# Patient Record
Sex: FEMALE | Race: ASIAN | NOT HISPANIC OR LATINO | URBAN - METROPOLITAN AREA
[De-identification: names, ages, dates, MRNs, and addresses within clinical notes are randomized per-mention and may not be internally consistent; named-entity substitution may affect disease eponyms.]

---

## 2022-08-19 ENCOUNTER — APPOINTMENT (OUTPATIENT)
Dept: RADIOLOGY | Facility: MEDICAL CENTER | Age: 57
DRG: 305 | End: 2022-08-19
Attending: EMERGENCY MEDICINE
Payer: MEDICARE

## 2022-08-19 ENCOUNTER — HOSPITAL ENCOUNTER (INPATIENT)
Facility: MEDICAL CENTER | Age: 57
LOS: 2 days | DRG: 305 | End: 2022-08-22
Attending: EMERGENCY MEDICINE | Admitting: STUDENT IN AN ORGANIZED HEALTH CARE EDUCATION/TRAINING PROGRAM
Payer: MEDICARE

## 2022-08-19 DIAGNOSIS — R29.90 STROKE-LIKE SYMPTOM: ICD-10-CM

## 2022-08-19 DIAGNOSIS — I69.30 HISTORY OF CVA WITH RESIDUAL DEFICIT: ICD-10-CM

## 2022-08-19 DIAGNOSIS — R47.1 DYSARTHRIA: ICD-10-CM

## 2022-08-19 DIAGNOSIS — I16.1 HYPERTENSIVE EMERGENCY: Primary | ICD-10-CM

## 2022-08-19 LAB
ABO + RH BLD: NORMAL
ABO GROUP BLD: NORMAL
ALBUMIN SERPL BCP-MCNC: 4.8 G/DL (ref 3.2–4.9)
ALBUMIN/GLOB SERPL: 1.5 G/DL
ALP SERPL-CCNC: 101 U/L (ref 30–99)
ALT SERPL-CCNC: 27 U/L (ref 2–50)
ANION GAP SERPL CALC-SCNC: 15 MMOL/L (ref 7–16)
APPEARANCE UR: ABNORMAL
APTT PPP: 23.3 SEC (ref 24.7–36)
AST SERPL-CCNC: 41 U/L (ref 12–45)
BACTERIA #/AREA URNS HPF: ABNORMAL /HPF
BASOPHILS # BLD AUTO: 0.2 % (ref 0–1.8)
BASOPHILS # BLD: 0.03 K/UL (ref 0–0.12)
BILIRUB SERPL-MCNC: 0.5 MG/DL (ref 0.1–1.5)
BILIRUB UR QL STRIP.AUTO: NEGATIVE
BLD GP AB SCN SERPL QL: NORMAL
BUN SERPL-MCNC: 16 MG/DL (ref 8–22)
CALCIUM SERPL-MCNC: 9.7 MG/DL (ref 8.5–10.5)
CHLORIDE SERPL-SCNC: 104 MMOL/L (ref 96–112)
CO2 SERPL-SCNC: 22 MMOL/L (ref 20–33)
COLOR UR: YELLOW
CREAT SERPL-MCNC: 0.63 MG/DL (ref 0.5–1.4)
EKG IMPRESSION: NORMAL
EOSINOPHIL # BLD AUTO: 0 K/UL (ref 0–0.51)
EOSINOPHIL NFR BLD: 0 % (ref 0–6.9)
EPI CELLS #/AREA URNS HPF: NEGATIVE /HPF
ERYTHROCYTE [DISTWIDTH] IN BLOOD BY AUTOMATED COUNT: 38.5 FL (ref 35.9–50)
GFR SERPLBLD CREATININE-BSD FMLA CKD-EPI: 103 ML/MIN/1.73 M 2
GLOBULIN SER CALC-MCNC: 3.3 G/DL (ref 1.9–3.5)
GLUCOSE SERPL-MCNC: 442 MG/DL (ref 65–99)
GLUCOSE UR STRIP.AUTO-MCNC: 500 MG/DL
HCT VFR BLD AUTO: 42.9 % (ref 37–47)
HGB BLD-MCNC: 15.5 G/DL (ref 12–16)
HYALINE CASTS #/AREA URNS LPF: ABNORMAL /LPF
IMM GRANULOCYTES # BLD AUTO: 0.07 K/UL (ref 0–0.11)
IMM GRANULOCYTES NFR BLD AUTO: 0.5 % (ref 0–0.9)
INR PPP: 0.86 (ref 0.87–1.13)
KETONES UR STRIP.AUTO-MCNC: NEGATIVE MG/DL
LACTATE SERPL-SCNC: 3.1 MMOL/L (ref 0.5–2)
LEUKOCYTE ESTERASE UR QL STRIP.AUTO: NEGATIVE
LYMPHOCYTES # BLD AUTO: 0.78 K/UL (ref 1–4.8)
LYMPHOCYTES NFR BLD: 6 % (ref 22–41)
MCH RBC QN AUTO: 30.8 PG (ref 27–33)
MCHC RBC AUTO-ENTMCNC: 36.1 G/DL (ref 33.6–35)
MCV RBC AUTO: 85.3 FL (ref 81.4–97.8)
MICRO URNS: ABNORMAL
MONOCYTES # BLD AUTO: 0.53 K/UL (ref 0–0.85)
MONOCYTES NFR BLD AUTO: 4.1 % (ref 0–13.4)
NEUTROPHILS # BLD AUTO: 11.5 K/UL (ref 2–7.15)
NEUTROPHILS NFR BLD: 89.2 % (ref 44–72)
NITRITE UR QL STRIP.AUTO: NEGATIVE
NRBC # BLD AUTO: 0 K/UL
NRBC BLD-RTO: 0 /100 WBC
PH UR STRIP.AUTO: 6.5 [PH] (ref 5–8)
PLATELET # BLD AUTO: 201 K/UL (ref 164–446)
PMV BLD AUTO: 10.6 FL (ref 9–12.9)
POTASSIUM SERPL-SCNC: 5.2 MMOL/L (ref 3.6–5.5)
PROT SERPL-MCNC: 8.1 G/DL (ref 6–8.2)
PROT UR QL STRIP: NEGATIVE MG/DL
PROTHROMBIN TIME: 11.7 SEC (ref 12–14.6)
RBC # BLD AUTO: 5.03 M/UL (ref 4.2–5.4)
RBC # URNS HPF: ABNORMAL /HPF
RBC UR QL AUTO: NEGATIVE
RH BLD: NORMAL
SODIUM SERPL-SCNC: 141 MMOL/L (ref 135–145)
SP GR UR STRIP.AUTO: 1.04
TROPONIN T SERPL-MCNC: <6 NG/L (ref 6–19)
UROBILINOGEN UR STRIP.AUTO-MCNC: 0.2 MG/DL
WBC # BLD AUTO: 12.9 K/UL (ref 4.8–10.8)
WBC #/AREA URNS HPF: ABNORMAL /HPF

## 2022-08-19 PROCEDURE — 87040 BLOOD CULTURE FOR BACTERIA: CPT | Mod: 91

## 2022-08-19 PROCEDURE — 36415 COLL VENOUS BLD VENIPUNCTURE: CPT

## 2022-08-19 PROCEDURE — 83930 ASSAY OF BLOOD OSMOLALITY: CPT

## 2022-08-19 PROCEDURE — 0042T CT-CEREBRAL PERFUSION ANALYSIS: CPT

## 2022-08-19 PROCEDURE — 86900 BLOOD TYPING SEROLOGIC ABO: CPT

## 2022-08-19 PROCEDURE — 83605 ASSAY OF LACTIC ACID: CPT

## 2022-08-19 PROCEDURE — 94760 N-INVAS EAR/PLS OXIMETRY 1: CPT

## 2022-08-19 PROCEDURE — 86850 RBC ANTIBODY SCREEN: CPT

## 2022-08-19 PROCEDURE — 70450 CT HEAD/BRAIN W/O DYE: CPT | Mod: MG

## 2022-08-19 PROCEDURE — 84484 ASSAY OF TROPONIN QUANT: CPT

## 2022-08-19 PROCEDURE — 71045 X-RAY EXAM CHEST 1 VIEW: CPT

## 2022-08-19 PROCEDURE — 96374 THER/PROPH/DIAG INJ IV PUSH: CPT

## 2022-08-19 PROCEDURE — 85025 COMPLETE CBC W/AUTO DIFF WBC: CPT

## 2022-08-19 PROCEDURE — 700111 HCHG RX REV CODE 636 W/ 250 OVERRIDE (IP): Performed by: EMERGENCY MEDICINE

## 2022-08-19 PROCEDURE — 70496 CT ANGIOGRAPHY HEAD: CPT | Mod: MG

## 2022-08-19 PROCEDURE — 81001 URINALYSIS AUTO W/SCOPE: CPT

## 2022-08-19 PROCEDURE — 84100 ASSAY OF PHOSPHORUS: CPT

## 2022-08-19 PROCEDURE — 85730 THROMBOPLASTIN TIME PARTIAL: CPT

## 2022-08-19 PROCEDURE — 700117 HCHG RX CONTRAST REV CODE 255: Performed by: EMERGENCY MEDICINE

## 2022-08-19 PROCEDURE — 83036 HEMOGLOBIN GLYCOSYLATED A1C: CPT

## 2022-08-19 PROCEDURE — 86901 BLOOD TYPING SEROLOGIC RH(D): CPT

## 2022-08-19 PROCEDURE — 85610 PROTHROMBIN TIME: CPT

## 2022-08-19 PROCEDURE — 99284 EMERGENCY DEPT VISIT MOD MDM: CPT | Performed by: PSYCHIATRY & NEUROLOGY

## 2022-08-19 PROCEDURE — 80053 COMPREHEN METABOLIC PANEL: CPT

## 2022-08-19 PROCEDURE — 99285 EMERGENCY DEPT VISIT HI MDM: CPT

## 2022-08-19 PROCEDURE — 83735 ASSAY OF MAGNESIUM: CPT

## 2022-08-19 PROCEDURE — 70498 CT ANGIOGRAPHY NECK: CPT | Mod: MG

## 2022-08-19 PROCEDURE — 93005 ELECTROCARDIOGRAM TRACING: CPT | Performed by: EMERGENCY MEDICINE

## 2022-08-19 RX ORDER — HYDRALAZINE HYDROCHLORIDE 20 MG/ML
20 INJECTION INTRAMUSCULAR; INTRAVENOUS ONCE
Status: COMPLETED | OUTPATIENT
Start: 2022-08-19 | End: 2022-08-19

## 2022-08-19 RX ORDER — SODIUM CHLORIDE 9 MG/ML
1000 INJECTION, SOLUTION INTRAVENOUS ONCE
Status: COMPLETED | OUTPATIENT
Start: 2022-08-19 | End: 2022-08-20

## 2022-08-19 RX ADMIN — HYDRALAZINE HYDROCHLORIDE 20 MG: 20 INJECTION, SOLUTION INTRAMUSCULAR; INTRAVENOUS at 22:14

## 2022-08-19 RX ADMIN — IOHEXOL 40 ML: 350 INJECTION, SOLUTION INTRAVENOUS at 21:45

## 2022-08-19 RX ADMIN — IOHEXOL 75 ML: 350 INJECTION, SOLUTION INTRAVENOUS at 22:00

## 2022-08-20 ENCOUNTER — APPOINTMENT (OUTPATIENT)
Dept: RADIOLOGY | Facility: MEDICAL CENTER | Age: 57
DRG: 305 | End: 2022-08-20
Attending: PSYCHIATRY & NEUROLOGY
Payer: MEDICARE

## 2022-08-20 ENCOUNTER — APPOINTMENT (OUTPATIENT)
Dept: CARDIOLOGY | Facility: MEDICAL CENTER | Age: 57
DRG: 305 | End: 2022-08-20
Attending: STUDENT IN AN ORGANIZED HEALTH CARE EDUCATION/TRAINING PROGRAM
Payer: MEDICARE

## 2022-08-20 PROBLEM — E87.20 LACTIC ACIDOSIS: Status: ACTIVE | Noted: 2022-08-20

## 2022-08-20 PROBLEM — D72.829 LEUKOCYTOSIS: Status: ACTIVE | Noted: 2022-08-20

## 2022-08-20 PROBLEM — I69.322 DYSARTHRIA FOLLOWING CEREBRAL INFARCTION: Status: ACTIVE | Noted: 2022-08-20

## 2022-08-20 PROBLEM — I16.1 HYPERTENSIVE EMERGENCY: Status: ACTIVE | Noted: 2022-08-20

## 2022-08-20 PROBLEM — I69.322 DYSARTHRIA FOLLOWING CEREBRAL INFARCTION: Status: RESOLVED | Noted: 2022-08-20 | Resolved: 2022-08-20

## 2022-08-20 PROBLEM — R82.71 ASYMPTOMATIC BACTERIURIA: Status: ACTIVE | Noted: 2022-08-20

## 2022-08-20 PROBLEM — D72.829 LEUKOCYTOSIS: Status: RESOLVED | Noted: 2022-08-20 | Resolved: 2022-08-20

## 2022-08-20 PROBLEM — I69.30 HISTORY OF CVA WITH RESIDUAL DEFICIT: Status: ACTIVE | Noted: 2022-08-20

## 2022-08-20 LAB
BASOPHILS # BLD AUTO: 0.1 % (ref 0–1.8)
BASOPHILS # BLD: 0.01 K/UL (ref 0–0.12)
EOSINOPHIL # BLD AUTO: 0 K/UL (ref 0–0.51)
EOSINOPHIL NFR BLD: 0 % (ref 0–6.9)
ERYTHROCYTE [DISTWIDTH] IN BLOOD BY AUTOMATED COUNT: 39.6 FL (ref 35.9–50)
EST. AVERAGE GLUCOSE BLD GHB EST-MCNC: 214 MG/DL
GLUCOSE BLD STRIP.AUTO-MCNC: 164 MG/DL (ref 65–99)
GLUCOSE BLD STRIP.AUTO-MCNC: 165 MG/DL (ref 65–99)
GLUCOSE BLD STRIP.AUTO-MCNC: 194 MG/DL (ref 65–99)
GLUCOSE BLD STRIP.AUTO-MCNC: 238 MG/DL (ref 65–99)
GLUCOSE BLD STRIP.AUTO-MCNC: 309 MG/DL (ref 65–99)
HBA1C MFR BLD: 9.1 % (ref 4–5.6)
HCT VFR BLD AUTO: 38.2 % (ref 37–47)
HGB BLD-MCNC: 13.9 G/DL (ref 12–16)
IMM GRANULOCYTES # BLD AUTO: 0.03 K/UL (ref 0–0.11)
IMM GRANULOCYTES NFR BLD AUTO: 0.3 % (ref 0–0.9)
LACTATE SERPL-SCNC: 1.5 MMOL/L (ref 0.5–2)
LACTATE SERPL-SCNC: 1.6 MMOL/L (ref 0.5–2)
LACTATE SERPL-SCNC: 1.7 MMOL/L (ref 0.5–2)
LACTATE SERPL-SCNC: 1.9 MMOL/L (ref 0.5–2)
LV EJECT FRACT  99904: 65
LV EJECT FRACT MOD 2C 99903: 69.2
LV EJECT FRACT MOD 4C 99902: 70.43
LV EJECT FRACT MOD BP 99901: 68.24
LYMPHOCYTES # BLD AUTO: 2.13 K/UL (ref 1–4.8)
LYMPHOCYTES NFR BLD: 20.2 % (ref 22–41)
MAGNESIUM SERPL-MCNC: 1.8 MG/DL (ref 1.5–2.5)
MAGNESIUM SERPL-MCNC: 1.9 MG/DL (ref 1.5–2.5)
MAGNESIUM SERPL-MCNC: 2.5 MG/DL (ref 1.5–2.5)
MCH RBC QN AUTO: 31.2 PG (ref 27–33)
MCHC RBC AUTO-ENTMCNC: 36.4 G/DL (ref 33.6–35)
MCV RBC AUTO: 85.7 FL (ref 81.4–97.8)
MONOCYTES # BLD AUTO: 0.89 K/UL (ref 0–0.85)
MONOCYTES NFR BLD AUTO: 8.4 % (ref 0–13.4)
NEUTROPHILS # BLD AUTO: 7.48 K/UL (ref 2–7.15)
NEUTROPHILS NFR BLD: 71 % (ref 44–72)
NRBC # BLD AUTO: 0 K/UL
NRBC BLD-RTO: 0 /100 WBC
OSMOLALITY SERPL: 319 MOSM/KG H2O (ref 278–298)
PHOSPHATE SERPL-MCNC: 3.7 MG/DL (ref 2.5–4.5)
PHOSPHATE SERPL-MCNC: 4.3 MG/DL (ref 2.5–4.5)
PLATELET # BLD AUTO: 233 K/UL (ref 164–446)
PMV BLD AUTO: 10.2 FL (ref 9–12.9)
PROCALCITONIN SERPL-MCNC: 0.28 NG/ML
RBC # BLD AUTO: 4.46 M/UL (ref 4.2–5.4)
WBC # BLD AUTO: 10.5 K/UL (ref 4.8–10.8)

## 2022-08-20 PROCEDURE — 36415 COLL VENOUS BLD VENIPUNCTURE: CPT

## 2022-08-20 PROCEDURE — 700102 HCHG RX REV CODE 250 W/ 637 OVERRIDE(OP): Performed by: STUDENT IN AN ORGANIZED HEALTH CARE EDUCATION/TRAINING PROGRAM

## 2022-08-20 PROCEDURE — 82962 GLUCOSE BLOOD TEST: CPT | Mod: 91

## 2022-08-20 PROCEDURE — 99223 1ST HOSP IP/OBS HIGH 75: CPT | Mod: AI,GC | Performed by: STUDENT IN AN ORGANIZED HEALTH CARE EDUCATION/TRAINING PROGRAM

## 2022-08-20 PROCEDURE — 770020 HCHG ROOM/CARE - TELE (206)

## 2022-08-20 PROCEDURE — A9270 NON-COVERED ITEM OR SERVICE: HCPCS | Performed by: STUDENT IN AN ORGANIZED HEALTH CARE EDUCATION/TRAINING PROGRAM

## 2022-08-20 PROCEDURE — 80053 COMPREHEN METABOLIC PANEL: CPT

## 2022-08-20 PROCEDURE — 93306 TTE W/DOPPLER COMPLETE: CPT | Mod: 26 | Performed by: INTERNAL MEDICINE

## 2022-08-20 PROCEDURE — 93306 TTE W/DOPPLER COMPLETE: CPT

## 2022-08-20 PROCEDURE — 84100 ASSAY OF PHOSPHORUS: CPT

## 2022-08-20 PROCEDURE — 70551 MRI BRAIN STEM W/O DYE: CPT | Mod: ME

## 2022-08-20 PROCEDURE — 92610 EVALUATE SWALLOWING FUNCTION: CPT

## 2022-08-20 PROCEDURE — 83605 ASSAY OF LACTIC ACID: CPT

## 2022-08-20 PROCEDURE — 700105 HCHG RX REV CODE 258: Performed by: EMERGENCY MEDICINE

## 2022-08-20 PROCEDURE — 83735 ASSAY OF MAGNESIUM: CPT

## 2022-08-20 PROCEDURE — 85025 COMPLETE CBC W/AUTO DIFF WBC: CPT

## 2022-08-20 PROCEDURE — 700111 HCHG RX REV CODE 636 W/ 250 OVERRIDE (IP)

## 2022-08-20 PROCEDURE — 84145 PROCALCITONIN (PCT): CPT

## 2022-08-20 PROCEDURE — 99233 SBSQ HOSP IP/OBS HIGH 50: CPT | Mod: GC | Performed by: HOSPITALIST

## 2022-08-20 RX ORDER — LOSARTAN POTASSIUM 25 MG/1
25 TABLET ORAL
Status: DISCONTINUED | OUTPATIENT
Start: 2022-08-20 | End: 2022-08-20 | Stop reason: ALTCHOICE

## 2022-08-20 RX ORDER — MAGNESIUM SULFATE HEPTAHYDRATE 40 MG/ML
2 INJECTION, SOLUTION INTRAVENOUS ONCE
Status: COMPLETED | OUTPATIENT
Start: 2022-08-20 | End: 2022-08-20

## 2022-08-20 RX ORDER — INSULIN LISPRO 100 [IU]/ML
2-9 INJECTION, SOLUTION INTRAVENOUS; SUBCUTANEOUS EVERY 4 HOURS
Status: DISCONTINUED | OUTPATIENT
Start: 2022-08-20 | End: 2022-08-21

## 2022-08-20 RX ORDER — INSULIN LISPRO 100 [IU]/ML
1-6 INJECTION, SOLUTION INTRAVENOUS; SUBCUTANEOUS EVERY 4 HOURS
Status: DISCONTINUED | OUTPATIENT
Start: 2022-08-20 | End: 2022-08-20

## 2022-08-20 RX ORDER — BISACODYL 10 MG
10 SUPPOSITORY, RECTAL RECTAL
Status: DISCONTINUED | OUTPATIENT
Start: 2022-08-20 | End: 2022-08-22 | Stop reason: HOSPADM

## 2022-08-20 RX ORDER — ONDANSETRON 2 MG/ML
4 INJECTION INTRAMUSCULAR; INTRAVENOUS EVERY 4 HOURS PRN
Status: DISCONTINUED | OUTPATIENT
Start: 2022-08-20 | End: 2022-08-22 | Stop reason: HOSPADM

## 2022-08-20 RX ORDER — INSULIN LISPRO 100 [IU]/ML
2-9 INJECTION, SOLUTION INTRAVENOUS; SUBCUTANEOUS
Status: DISCONTINUED | OUTPATIENT
Start: 2022-08-20 | End: 2022-08-20

## 2022-08-20 RX ORDER — ACETAMINOPHEN 325 MG/1
650 TABLET ORAL EVERY 6 HOURS PRN
Status: DISCONTINUED | OUTPATIENT
Start: 2022-08-20 | End: 2022-08-22 | Stop reason: HOSPADM

## 2022-08-20 RX ORDER — AMLODIPINE BESYLATE 5 MG/1
5 TABLET ORAL
Status: DISCONTINUED | OUTPATIENT
Start: 2022-08-20 | End: 2022-08-20 | Stop reason: ALTCHOICE

## 2022-08-20 RX ORDER — LABETALOL HYDROCHLORIDE 5 MG/ML
10 INJECTION, SOLUTION INTRAVENOUS EVERY 4 HOURS PRN
Status: DISCONTINUED | OUTPATIENT
Start: 2022-08-20 | End: 2022-08-20

## 2022-08-20 RX ORDER — POLYETHYLENE GLYCOL 3350 17 G/17G
1 POWDER, FOR SOLUTION ORAL
Status: DISCONTINUED | OUTPATIENT
Start: 2022-08-20 | End: 2022-08-22 | Stop reason: HOSPADM

## 2022-08-20 RX ORDER — ATORVASTATIN CALCIUM 40 MG/1
40 TABLET, FILM COATED ORAL EVERY EVENING
Status: DISCONTINUED | OUTPATIENT
Start: 2022-08-20 | End: 2022-08-21

## 2022-08-20 RX ORDER — LOSARTAN POTASSIUM 50 MG/1
50 TABLET ORAL
Status: DISCONTINUED | OUTPATIENT
Start: 2022-08-20 | End: 2022-08-21

## 2022-08-20 RX ORDER — INSULIN LISPRO 100 [IU]/ML
0.2 INJECTION, SOLUTION INTRAVENOUS; SUBCUTANEOUS
Status: DISCONTINUED | OUTPATIENT
Start: 2022-08-20 | End: 2022-08-20

## 2022-08-20 RX ORDER — LABETALOL HYDROCHLORIDE 5 MG/ML
10 INJECTION, SOLUTION INTRAVENOUS EVERY 4 HOURS PRN
Status: DISCONTINUED | OUTPATIENT
Start: 2022-08-20 | End: 2022-08-22 | Stop reason: HOSPADM

## 2022-08-20 RX ORDER — PROCHLORPERAZINE EDISYLATE 5 MG/ML
5-10 INJECTION INTRAMUSCULAR; INTRAVENOUS EVERY 4 HOURS PRN
Status: DISCONTINUED | OUTPATIENT
Start: 2022-08-20 | End: 2022-08-22 | Stop reason: HOSPADM

## 2022-08-20 RX ORDER — PROMETHAZINE HYDROCHLORIDE 25 MG/1
12.5-25 SUPPOSITORY RECTAL EVERY 4 HOURS PRN
Status: DISCONTINUED | OUTPATIENT
Start: 2022-08-20 | End: 2022-08-22 | Stop reason: HOSPADM

## 2022-08-20 RX ORDER — AMOXICILLIN 250 MG
2 CAPSULE ORAL 2 TIMES DAILY
Status: DISCONTINUED | OUTPATIENT
Start: 2022-08-20 | End: 2022-08-22 | Stop reason: HOSPADM

## 2022-08-20 RX ORDER — PROMETHAZINE HYDROCHLORIDE 25 MG/1
12.5-25 TABLET ORAL EVERY 4 HOURS PRN
Status: DISCONTINUED | OUTPATIENT
Start: 2022-08-20 | End: 2022-08-22 | Stop reason: HOSPADM

## 2022-08-20 RX ORDER — ONDANSETRON 4 MG/1
4 TABLET, ORALLY DISINTEGRATING ORAL EVERY 4 HOURS PRN
Status: DISCONTINUED | OUTPATIENT
Start: 2022-08-20 | End: 2022-08-22 | Stop reason: HOSPADM

## 2022-08-20 RX ADMIN — INSULIN LISPRO 2 UNITS: 100 INJECTION, SOLUTION INTRAVENOUS; SUBCUTANEOUS at 22:22

## 2022-08-20 RX ADMIN — INSULIN LISPRO 2 UNITS: 100 INJECTION, SOLUTION INTRAVENOUS; SUBCUTANEOUS at 18:10

## 2022-08-20 RX ADMIN — ATORVASTATIN CALCIUM 40 MG: 40 TABLET, FILM COATED ORAL at 18:09

## 2022-08-20 RX ADMIN — INSULIN LISPRO 2 UNITS: 100 INJECTION, SOLUTION INTRAVENOUS; SUBCUTANEOUS at 11:18

## 2022-08-20 RX ADMIN — MAGNESIUM SULFATE HEPTAHYDRATE 2 G: 40 INJECTION, SOLUTION INTRAVENOUS at 10:56

## 2022-08-20 RX ADMIN — INSULIN HUMAN 5 UNITS: 100 INJECTION, SOLUTION PARENTERAL at 04:02

## 2022-08-20 RX ADMIN — INSULIN LISPRO 3 UNITS: 100 INJECTION, SOLUTION INTRAVENOUS; SUBCUTANEOUS at 06:22

## 2022-08-20 RX ADMIN — SODIUM CHLORIDE 1000 ML: 9 INJECTION, SOLUTION INTRAVENOUS at 00:12

## 2022-08-20 RX ADMIN — INSULIN GLARGINE-YFGN 10 UNITS: 100 INJECTION, SOLUTION SUBCUTANEOUS at 18:09

## 2022-08-20 RX ADMIN — LOSARTAN POTASSIUM 50 MG: 50 TABLET, FILM COATED ORAL at 18:09

## 2022-08-20 ASSESSMENT — COGNITIVE AND FUNCTIONAL STATUS - GENERAL
CLIMB 3 TO 5 STEPS WITH RAILING: A LOT
DRESSING REGULAR UPPER BODY CLOTHING: A LOT
HELP NEEDED FOR BATHING: A LOT
PERSONAL GROOMING: A LITTLE
DRESSING REGULAR LOWER BODY CLOTHING: A LOT
SUGGESTED CMS G CODE MODIFIER MOBILITY: CL
TURNING FROM BACK TO SIDE WHILE IN FLAT BAD: A LITTLE
DAILY ACTIVITIY SCORE: 14
TOILETING: A LOT
MOVING FROM LYING ON BACK TO SITTING ON SIDE OF FLAT BED: A LOT
EATING MEALS: A LITTLE
MOVING TO AND FROM BED TO CHAIR: A LITTLE
STANDING UP FROM CHAIR USING ARMS: A LOT
SUGGESTED CMS G CODE MODIFIER DAILY ACTIVITY: CK
WALKING IN HOSPITAL ROOM: A LOT
MOBILITY SCORE: 14

## 2022-08-20 ASSESSMENT — LIFESTYLE VARIABLES
TOTAL SCORE: 0
EVER FELT BAD OR GUILTY ABOUT YOUR DRINKING: NO
HAVE PEOPLE ANNOYED YOU BY CRITICIZING YOUR DRINKING: NO
TOTAL SCORE: 0
TOTAL SCORE: 0
ON A TYPICAL DAY WHEN YOU DRINK ALCOHOL HOW MANY DRINKS DO YOU HAVE: 0
HOW MANY TIMES IN THE PAST YEAR HAVE YOU HAD 5 OR MORE DRINKS IN A DAY: 0
DOES PATIENT WANT TO STOP DRINKING: NO
AVERAGE NUMBER OF DAYS PER WEEK YOU HAVE A DRINK CONTAINING ALCOHOL: 0
ALCOHOL_USE: NO
CONSUMPTION TOTAL: NEGATIVE
HAVE YOU EVER FELT YOU SHOULD CUT DOWN ON YOUR DRINKING: NO
EVER HAD A DRINK FIRST THING IN THE MORNING TO STEADY YOUR NERVES TO GET RID OF A HANGOVER: NO

## 2022-08-20 ASSESSMENT — ENCOUNTER SYMPTOMS
PALPITATIONS: 0
HEADACHES: 1
ORTHOPNEA: 0
CHILLS: 0
SHORTNESS OF BREATH: 0
COUGH: 0
NAUSEA: 1
VOMITING: 1
DIARRHEA: 0
BLURRED VISION: 0
SENSORY CHANGE: 0
ABDOMINAL PAIN: 0
SPEECH CHANGE: 1
FEVER: 0
DOUBLE VISION: 0
LOSS OF CONSCIOUSNESS: 0
WEAKNESS: 0
FOCAL WEAKNESS: 0
DIAPHORESIS: 0
CONSTIPATION: 0
PND: 0

## 2022-08-20 ASSESSMENT — PATIENT HEALTH QUESTIONNAIRE - PHQ9
7. TROUBLE CONCENTRATING ON THINGS, SUCH AS READING THE NEWSPAPER OR WATCHING TELEVISION: MORE THAN HALF THE DAYS
6. FEELING BAD ABOUT YOURSELF - OR THAT YOU ARE A FAILURE OR HAVE LET YOURSELF OR YOUR FAMILY DOWN: MORE THAN HALF THE DAYS
4. FEELING TIRED OR HAVING LITTLE ENERGY: SEVERAL DAYS
1. LITTLE INTEREST OR PLEASURE IN DOING THINGS: SEVERAL DAYS
2. FEELING DOWN, DEPRESSED, IRRITABLE, OR HOPELESS: SEVERAL DAYS
3. TROUBLE FALLING OR STAYING ASLEEP OR SLEEPING TOO MUCH: MORE THAN HALF THE DAYS
SUM OF ALL RESPONSES TO PHQ9 QUESTIONS 1 AND 2: 2
9. THOUGHTS THAT YOU WOULD BE BETTER OFF DEAD, OR OF HURTING YOURSELF: NOT AT ALL
8. MOVING OR SPEAKING SO SLOWLY THAT OTHER PEOPLE COULD HAVE NOTICED. OR THE OPPOSITE, BEING SO FIGETY OR RESTLESS THAT YOU HAVE BEEN MOVING AROUND A LOT MORE THAN USUAL: MORE THAN HALF THE DAYS
5. POOR APPETITE OR OVEREATING: NOT AT ALL
SUM OF ALL RESPONSES TO PHQ QUESTIONS 1-9: 11

## 2022-08-20 ASSESSMENT — PAIN DESCRIPTION - PAIN TYPE
TYPE: ACUTE PAIN
TYPE: ACUTE PAIN

## 2022-08-20 ASSESSMENT — FIBROSIS 4 INDEX
FIB4 SCORE: 2.24
FIB4 SCORE: 1.93

## 2022-08-20 NOTE — ED NOTES
2nd set of blood culture collected. Urine specimen collected via straight catheterization as per order. Specimens sent to lab.

## 2022-08-20 NOTE — ED NOTES
CC:  Digna Miller is here today for Physical    Medications: medications verified and updated  Added preferred pharmacy  Refills needed today?  NO  denies Latex allergy or sensitivity  Health Maintenance Due   Topic Date Due   • Influenza Vaccine (1) 08/01/2018     Patient is up to date, no discussion needed. Patient got flu vaccine at work October 1st, 2018.                          [facility] called for pt. Staff is made aware that pt is ready for transfer as soon as possible.

## 2022-08-20 NOTE — DIETARY
"Nutrition services: Day 0 of admit.  Alberto Hunter is a 57 y.o. female with admitting DX of hypertensive emergency    Consult received for BMI <19 and nutrition education consult. Pt was eating her first meal since yesterday at time of RD visit. She appeared hungry. Pt appeared alert but was having some difficulty speaking. She reports she was eating normal foods and portions PTA. Per pt, she has not had any recent weight changes. Attempted to provide nutrition education for diabetes but pt declined as she was eating her breakfast. She nodded her head yes that she is interested in nutrition for education for diabetes at another time. She reports she has not had any previous education for diabetes in the past. RD provided pt [id number] nutrition booklet at bedside. She did not have any nutrition related questions or concerns at this time.     Assessment:  Height: 149.9 cm (4' 11\")  Weight: 42.3 kg (93 lb 4.1 oz)  Body mass index is 18.84 kg/m²., BMI classification: normal weight  Diet/Intake: Level 5- minced and moist with Level 0- thin liquids and consistent carb modifier. PO diet started today, no PO intake recorded in ADL's at this time.     Evaluation:   Pt admitted for hypertensive emergency with lactic acidosis, uncontrolled DM, leukocytosis, hx of CVA, dysarthria following cerebral infarction, asymptomatic bacteriuria  Per MD note, pt admitted w/ difficulty speaking and slurred speech, pending MRI of brain   Based on chart review, no recent weight history listed in chart.   Labs (8/19): glucose 442, HgbA1C 9.1, Alk phos 101  Meds:  insulin glargine (10 units q evening), insulin lispro sliding scale, magnesium sulfate, senna-docusate, Zofran, Compazine, Phenergan, bowel regimen  Skin: No staged wounds noted  GI: last BM 8/19    Malnutrition Risk: Unable to fully assess at this time d/t pt eating her breakfast and having difficulty speaking.     Recommendations/Plan:  Continue current PO diet  Encourage intake of " meals  Document intake of all meals  as % taken in ADL's to provide interdisciplinary communication across all shifts.   Monitor weight.  Nutrition rep will continue to see patient for ongoing meal and snack preferences.   F/u for nutrition education for diabetes as able    RD following.

## 2022-08-20 NOTE — THERAPY
Speech Language Pathology   Clinical Swallow Evaluation     Patient Name: Alberto Hunter  AGE:  57 y.o., SEX:  female  Medical Record #: [id number]  Today's Date: 8/20/2022        HPI: Patient is a 57 y.o. female who presented 8/19/2022 with concerns of worsening of her slurred speech.  Patient on arrival found to have blood pressure of 214/100 with concerns of possible stroke. MRI pending.       PMHx:  history of multiple CVAs with chronic left-sided deficits and contraction. A1c 9.1% Not on any home medication  Blood glucose 442 on admission.     Head CT:   1.  Subtle hyperdensity adjacent to the right caudate head, could represent subtle parenchymal hemorrhage or areas of subtle calcification. Could be further evaluated with MRI of the brain for more definitive characterization as clinically appropriate.   2.  Nonspecific white matter changes, commonly associated with small vessel ischemic disease.  Associated mild cerebral atrophy is noted.   3.  Atherosclerosis.     CTA-Head:    1.  Short segment occlusion of the proximal left A2 segment with reconstitution.  2.  Small caliber of the right intracranial internal carotid artery at the level of the sella, could represent partial occlusion, hypoplastic segment, or vascular spasm  3.  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  5.  Referral to neuroradiology aneurysm clinic for further management.    CXR:   1.  No acute cardiopulmonary disease.  2.  Atherosclerosis    Level of Consciousness: Alert  Affect/Behavior: Appropriate  Follows Directives: Yes  Orientation: Self  Hearing: Functional hearing  Vision: Functional vision    Oral Mechanism Evaluation  Facial Symmetry: Central L facial droop  Facial Sensation: Impaired  Labial Observations: L sided weakness  Lingual Observations: L lingual deviation  Dentition: Edentulous  Comments:      Voice  Quality:  Whisper  Resonance: WFL  Intensity: Soft  Cough: WFL  Comments:      Current Method of Nutrition   NPO until cleared by speech pathology      Assessment  Positioning: Davis's (60-90 degrees)  Bolus Administration: SLP and Patient  Oxygen Requirements: Room Air  Factor(s) Affecting Performance: None    Swallowing Trials  Ice: WFL  Thin Liquid (TN0): Impaired  Liquidised (LQ3): WFL  Pureed (PU4): WFL  Minced & Moist (MM5): WFL  Soft & Bite-Sized (SB6): Impaired    Comments: Patient with immediate cough response on serial sips via straw but eliminated with cues for single sips. Patient unable to masticate soft solids this session.        Clinical Impressions: Patient presents with mild-moderate oropharyngeal dysphagia which appears consistent with baseline dysphagia per patient and family. Patient with moderate oral phase dysphagia as evidenced by left sided weaknes, lingual pumping, decreased A-P bolus transit noted by oral residue on tongue and anterior bolus loss. Patient with isolated episode of immediate cough response on serial straw sips of thin liquids. No further episodes concerning penetration or aspiration appreciated during remainder of thin liquid trials when provided cues for single sips only. Patient unable to adequately masticate soft solids and requested to expectorate un-masticated peach.  At this time, patient appears at a level for a modified diet of mince and moist with thin liquids.     Recommendations  1.  Mince and moist with thin liquids (MM5/TN0).   2.  Instrumentation: Instrumental swallow study pending clinical progress  3.  Swallowing Instructions & Precautions:   Supervision: Assist with meal tray set up, Close supervision - patient may be left alone for less than 5 minutes at a time  Positioning: Fully upright and midline during oral intake  Medication: Whole with puree  Strategies: Small bites/sips, Slow rate of intake  Oral Care: BID    Plan    Recommend Speech Therapy 3 times per week  "until therapy goals are met for the following treatments:  Dysphagia Training.    Discharge Recommendations: Recommend outpatient speech therapy services       Objective       08/20/22 0948   Patient / Family Goals   Patient / Family Goal #1 \" I want orange Juice\"   Short Term Goals   Short Term Goal # 1 The patient will utilize swallow strategies during MM5/TN0 meal items with no overt s/s of aspirtion or difficulty given min cues.   Education Group   Education Provided Dysphagia   Dysphagia Patient Response Patient;Family;Acceptance;Explanation;Verbal Demonstration;Reinforcement Needed   Anticipated Discharge Needs   Discharge Recommendations Recommend outpatient speech therapy services   Therapy Recommendations Upon DC Dysphagia Training;Patient / Family / Caregiver Education     "

## 2022-08-20 NOTE — PROGRESS NOTES
Pt arrived to unit via gurney at 0230. Pt oriented to room, unit, and plan of care. Tele-monitor placed and monitor room notified. All questions answered at this time. Call light within reach, and fall precautions in place.

## 2022-08-20 NOTE — ASSESSMENT & PLAN NOTE
UA with glucosuria many bacteria however no urinary symptoms  Chest x-ray no acute cardiopulmonary abnormality  No acute signs or symptoms of infectious process at this time  Afebrile on admission, WBC of 12k    -Likely reactive in the setting of hypertensive emergency

## 2022-08-20 NOTE — ASSESSMENT & PLAN NOTE
LA 3.1 on admission  Status post 1 L of NS in the ED  Troponin negative  UA asymptomatic bacteruria  Resolved

## 2022-08-20 NOTE — NON-PROVIDER
Medical Student Progress Note, for Education Purposes Only    Internal Medicine Daily Progress Note  Note Author: Car Anaya MS4    Date of Service: 8/20/2022  Date of Admission: 8/19/2022   Primary Team: [facility] IM Blue Team   Attending: Dr. Shanel MD   Senior Resident: Dr. Padron  Intern: Dr. Friedman    ID:  Alberto Hunter is a 57 y.o. female who presented on 8/19/2022 with a PMH significant for 4 CVAs with residual left sided effects, hypertension, and uncontrolled diabetes who presented to the ED with hypertensive emergency of 214/100 that improved with IV hydralazine (current /86) with complaints of headache and dizziness.     Interval Events:  Waiting on family to come to fill out MRI screening sheet.  Pt unable to do.  CT cerebral perfusion   - 0mL of completed infarct  - 0mL of ischemia or infarct  - No brain penumbra  CT head   - CT head with possible concern for hyperdense lesion on the right caudate head which could be hemorrhage but most likely calcification    SUBJECTIVE:  Mrs. Hunter feels mildly improved from yesterday night, but still complains of increased difficulty of speech, right temporal pounding headache that has not subsided and states that she has never experienced before. She is additionally experiencing chills, nausea, watery diarrhea, dry cough and dizziness. She is unable to ambulate due to chronic left sided motor weakness and uses a wheelchair at home. At times is difficult to understand due to dysarthria. Denies changes in vision, strength, appetite, SOB, tasate, swallowing, strength, or sensations.    Consultants/Specialty:   Neurology: CT head shows possible hemorrhage, per neurology treat as hemorrhage until MRI is done.    Plan:  1.  Keep blood pressure between 110s to 160 systolic.  2.  Get MRI brain.  3.  No antiplatelets or anticoagulation.  4.  Would not get the full stroke work-up until we get clarification with the patient's wishes.  Patient refuses to take  medications at home at baseline.    OBJECTIVE:  Vitals   Temp:  [36.7 °C (98 °F)] 36.7 °C (98 °F)  Pulse:  [] 110  Resp:  [12-21] 16  BP: (159-226)/() 167/105  SpO2:  [91 %-97 %] 94 %    Body mass index is 18.84 kg/m².    Physical Exam:  General: Pt lying comfortably in bed, NAD, not toxic-appearing  HEENT: Oropharynx non-erythematous and non-edematous, tongue slightly protruding. Dysarthria present.  Cardiovascular: RRR, no M/R/G. Dorsalis pedis and radial pulses 2+ and symmetric bilaterally  Pulmonary:  Shallow breaths on deep breathing, CTAB, no crackles or wheezing auscultated  Abdominal: Soft, BS+, ND, NT to palpation in all 4 quadrants. No HSM  Musculoskeletal: LLE is 1/5 motor strength. LUE is 0/5 motor with significant rigidity. RUE 4/5. RLE 4/5.   Skin: No bruises, rashes, or discoloration appreciated  Extremities: No LE erythema, edema, or tenderness  Neuro: Arouses to name. A&O x3 (not to date).   CN III = unable to smile or frown and puff cheeks on left side of face.   CN V2,V3 = Left sided face sensations diminished  CN VIII = hearing less on left  CN XI = unable to shrug left shoulder  CN XII = tongue appears deviated to right  Sensation grossly intact BUE and BLE.   Intact finger-nose-finger on right unable to attempt on left.  Unable to ambulate.  MMSE: Pt was sad throughout interview       Labs/Imaging:  Recent/pertinent lab results & imaging reviewed.  Lab Results   Component Value Date/Time    WBC 12.9 (H) 08/19/2022 08:59 PM    RBC 5.03 08/19/2022 08:59 PM    HEMOGLOBIN 15.5 08/19/2022 08:59 PM    HEMATOCRIT 42.9 08/19/2022 08:59 PM    MCV 85.3 08/19/2022 08:59 PM    MCH 30.8 08/19/2022 08:59 PM    MCHC 36.1 (H) 08/19/2022 08:59 PM    MPV 10.6 08/19/2022 08:59 PM    NEUTSPOLYS 89.20 (H) 08/19/2022 08:59 PM    LYMPHOCYTES 6.00 (L) 08/19/2022 08:59 PM    MONOCYTES 4.10 08/19/2022 08:59 PM    EOSINOPHILS 0.00 08/19/2022 08:59 PM    BASOPHILS 0.20 08/19/2022 08:59 PM      Lab Results    Component Value Date/Time    SODIUM 141 08/19/2022 08:59 PM    POTASSIUM 5.2 08/19/2022 08:59 PM    CHLORIDE 104 08/19/2022 08:59 PM    CO2 22 08/19/2022 08:59 PM    GLUCOSE 442 (H) 08/19/2022 08:59 PM    BUN 16 08/19/2022 08:59 PM    CREATININE 0.63 08/19/2022 08:59 PM      Lab Results   Component Value Date/Time    PROTHROMBTM 11.7 (L) 08/19/2022 08:59 PM    INR 0.86 (L) 08/19/2022 08:59 PM        DX-CHEST-PORTABLE (1 VIEW)   Final Result         1.  No acute cardiopulmonary disease.   2.  Atherosclerosis      CT-CTA NECK WITH & W/O-POST PROCESSING   Final Result         1.  No occlusion, aneurysm, dissection, or high-grade stenosis.   2.  Atherosclerosis         CT-CTA HEAD WITH & W/O-POST PROCESS   Final Result         1.  Short segment occlusion of the proximal left A2 segment with reconstitution.   2.  Small caliber of the right intracranial internal carotid artery at the level of the sella, could represent partial occlusion, hypoplastic segment, or vascular spasm   3.  3.3 mm proximal M2 branches from aneurysm   4.  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   5.  Referral to neuroradiology aneurysm clinic for further management.      These findings were discussed with the patient's clinician, Selvin Nowak, on 8/19/2022 9:41 PM.      CT-CEREBRAL PERFUSION ANALYSIS   Final Result         1.  Cerebral blood flow less than 30% likely representing completed infarct = 0 mL.      2.  T Max more than 6 seconds likely representing combination of completed infarct and ischemia = 0 mL.      3.  Mismatched volume likely representing ischemic brain/penumbra = None      4.  Please note that the cerebral perfusion was performed on the limited brain tissue around the basal ganglia region. Infarct/ischemia outside the CT perfusion sections can be missed in this study.      CT-HEAD W/O   Final Result         1.  Subtle hyperdensity adjacent to the right  caudate head, could represent subtle parenchymal hemorrhage or areas of subtle calcification. Could be further evaluated with MRI of the brain for more definitive characterization as clinically appropriate.   2.  Nonspecific white matter changes, commonly associated with small vessel ischemic disease.  Associated mild cerebral atrophy is noted.   3.  Atherosclerosis.      These findings were discussed with the patient's clinician, Selvin Nowak, on 8/19/2022 9:23 PM.      MR-BRAIN-W/O    (Results Pending)       Assessment/Plan   Pt is a 57 y.o. female with a PMH significant for 4 CVAs with residual left sided effects, hypertension, and uncontrolled diabetes who presented to the ED with hypertensive emergency of 214/100 that improved with IV hydralazine (current /86) with complaints of headache and dizziness.     #Hypertensive emergency  /100 on admission  Complaints of dizziness and HA   Takes no medications at home   - Keep systolic -160. PRN IV antihypertensives (hydralazine)  - Losartan   - Amlodipine (vasospasm)    #History of CVA with residual effects  #Acute on Chronic Dysrthria   Dysarthria   Left sided weakness  Uses wheelchair   Subtle hyperdensity adjacent to the right caudate head, could represent subtle parenchymal hemorrhage or areas of subtle calcification  - MRI pending: Waiting on family to come to fill out MRI screening sheet.  Pt unable to do.  - Atorvastatin   - Lipid panel pending  - hold aspirin until MRI  - PT/ OT  - Speech therapy   - Swallow eval    #Uncontrolled diabetes mellitus   A1c 9.1%  Glucose 442 on admin   - Insulin lispro sliding scale q4hr  - Received IV insulin bolus   - Diabetes education    #Asymptomatic bacteriuria   #Mild leukocytosis  UA shows many bacteria  Could be a result of sympathetic activation and proinflammatory markers  - D/C nitrofurantoin     #Lactic acidosis   Anion gap normal   LA 3.1 on admission   - Trend   - IVF as needed    Code  status  -DNR/ DNI    Disposition  - MRI head

## 2022-08-20 NOTE — PROGRESS NOTES
4 Eyes Skin Assessment Completed by EDDIE Leon and EDDIE Mccray.    Head WDL  Ears WDL  Nose WDL  Mouth WDL  Neck WDL  Breast/Chest WDL  Shoulder Blades WDL  Spine WDL  (R) Arm/Elbow/Hand Redness and Blanching elbow  (L) Arm/Elbow/Hand Redness and Blanching elbow  Abdomen WDL  Groin WDL  Scrotum/Coccyx/Buttocks Redness and Blanching  (R) Leg WDL  (L) Leg WDL  (R) Heel/Foot/Toe Redness and Blanching  (L) Heel/Foot/Toe Redness and Blanching      Devices In Places Tele Box      Interventions In Place Pillows    Possible Skin Injury No    Pictures Uploaded Into Epic N/A  Wound Consult Placed N/A  RN Wound Prevention Protocol Ordered Yes

## 2022-08-20 NOTE — ASSESSMENT & PLAN NOTE
A1c 9.1%  Not on any home medication  Blood glucose 442 on admission    -Basal insulin  -SSI and POCG  -Will need diabetes education counseling  - Diabetic diet

## 2022-08-20 NOTE — H&P
"[facility] Internal Medicine History & Physical Note    Date of Service  8/20/2022    R Team: [facility]OM Admitting Team  Attending: Angelica Mcknight M.d.  Senior Resident: Dr. Bronson  Contact Number: [phone]    Primary Care Physician  No primary care provider on file.    Consultants  neurology    Specialist Names: Dr. Sheffield    Code Status  DNAR/DNI    Chief Complaint  Chief Complaint   Patient presents with    Possible Stroke     LKW 1900. Baseline L sided weakness from prior stroke. Acute \"difficulty speaking\"/slurred speech per , currently can answer questions but slow to respond. No new focal/unilateral deficits. .       History of Presenting Illness (HPI):   Alberto Hunter is a 57 y.o. female who presented 8/19/2022 with history of multiple CVAs with chronic left-sided deficits and contraction who presented today for concerns of worsening of her slurred speech.  Patient on arrival found to have blood pressure of 214/100 with concerns of possible stroke.  CT head showing subtle hyperdensity adjacent to the right caudate head which could represent subtle parenchymal hemorrhage however with discussion with neurology and radiology with the ED provider as well most likely an area of calcification.  CTA of the head neck showed short segment occlusion of proximal left A2 segment with reconstitution, small caliber of the right intracranial internal carotid artery which could represent partial occlusion hypoplastic segment or vascular spasm, 3.3 mm proximal M2 branches from the aneurysm, possible 1 mm saccular aneurysm projecting from the petrous apex of the right ICA.  CT perfusion study was normal.     Patient states her major symptoms that she feels is just headache, nausea with 1 episode of non-bloody non-bilious emesis and that she has slurred speech at baseline but feels as though this is a little bit worse than baseline.  Denies any other acute complaints.    Neurology recommended keeping blood " pressures of systolic 110s to 160s and obtaining MRI as well as holding antiplatelet and anticoagulation with assumption that finding on CT head could possibly be a bleed despite lower suspicion.  At time of their evaluation patient was refusing to take home medications, however upon my evaluation patient verbalized that she was willing to take home medications and pursue the ongoing work-up.     Patient's blood sugar was also notably found to be 442 on admission, with a lactic acid of 3.1, UA showing glucosuria many bacteria but negative for nitrite or leukocyte esterase but no urinary symptoms.  She does have mild leukocytosis of 12k.  In the ED received 1 L of NS bolus, subsequent glucose in the 300s.    I discussed the plan of care with patient.    Review of Systems  Review of Systems   Constitutional:  Negative for chills, diaphoresis, fever and malaise/fatigue.   Eyes:  Negative for blurred vision and double vision.   Respiratory:  Negative for cough and shortness of breath.    Cardiovascular:  Negative for chest pain, palpitations, orthopnea, leg swelling and PND.   Gastrointestinal:  Positive for nausea and vomiting. Negative for abdominal pain, constipation and diarrhea.   Genitourinary:  Negative for dysuria and hematuria.   Neurological:  Positive for speech change and headaches. Negative for sensory change, focal weakness, loss of consciousness and weakness.     Past Medical History   has no past medical history on file.    Surgical History   has no past surgical history on file.     Family History  family history is not on file.   Family history reviewed with patient.     Social History  Tobacco: Denies tobacco use  Alcohol: Denies alcohol use  Recreational drugs (illegal or prescription): As recreational drug  Employment: Did not discuss  Living Situation: Lives in [place] with  and children  Recent Travel: Denies recent travel  Primary Care Provider: Reviewed does not have primary care  provider  Other (stressors, spirituality, exposures): N/A    Allergies  Not on File    Medications  None       Physical Exam  Temp:  [36.7 °C (98 °F)] 36.7 °C (98 °F)  Pulse:  [] 109  Resp:  [12-21] 20  BP: (159-226)/() 174/89  SpO2:  [91 %-97 %] 96 %  Blood Pressure: (!) 174/89   Temperature: 36.7 °C (98 °F)   Pulse: (!) 109   Respiration: 20   Pulse Oximetry: 96 %       Physical Exam  Vitals and nursing note reviewed.   Constitutional:       General: She is not in acute distress.     Appearance: She is not diaphoretic.   HENT:      Head: Normocephalic and atraumatic.      Nose: Nose normal.      Mouth/Throat:      Pharynx: Oropharynx is clear.   Eyes:      Extraocular Movements: Extraocular movements intact.      Conjunctiva/sclera: Conjunctivae normal.      Pupils: Pupils are equal, round, and reactive to light.   Cardiovascular:      Rate and Rhythm: Normal rate and regular rhythm.      Pulses: Normal pulses.      Heart sounds: No murmur heard.    No friction rub. No gallop.   Pulmonary:      Effort: Pulmonary effort is normal. No respiratory distress.      Breath sounds: No wheezing, rhonchi or rales.   Abdominal:      General: Bowel sounds are normal. There is no distension.      Palpations: Abdomen is soft.      Tenderness: There is no abdominal tenderness. There is no guarding.   Musculoskeletal:      Cervical back: Neck supple.      Right lower leg: No edema.      Left lower leg: No edema.   Skin:     General: Skin is warm and dry.   Neurological:      Mental Status: She is alert and oriented to person, place, and time.      Comments: Patient has foreign accent but with superimposed moderate dysarthria.  Answers questions appropriately and is able to follow commands.  Chronic left-sided upper extremity contracture and left lower extremity weakness.  Left upper extremity and lower extremity without drift and strength 5 out of 5.  No sensory deficits noted along bilateral extremity.  States  decreased sensation along right V1 V2 V3 distribution in comparison to the left.  Chronic right-sided mild facial droop.   Psychiatric:         Mood and Affect: Mood normal.       Laboratory:  Recent Labs     08/19/22 2059   WBC 12.9*   RBC 5.03   HEMOGLOBIN 15.5   HEMATOCRIT 42.9   MCV 85.3   MCH 30.8   MCHC 36.1*   RDW 38.5   PLATELETCT 201   MPV 10.6     Recent Labs     08/19/22 2059   SODIUM 141   POTASSIUM 5.2   CHLORIDE 104   CO2 22   GLUCOSE 442*   BUN 16   CREATININE 0.63   CALCIUM 9.7     Recent Labs     08/19/22 2059   ALTSGPT 27   ASTSGOT 41   ALKPHOSPHAT 101*   TBILIRUBIN 0.5   GLUCOSE 442*     Recent Labs     08/19/22 2059   APTT 23.3*   INR 0.86*     No results for input(s): NTPROBNP in the last 72 hours.      Recent Labs     08/19/22 2059   TROPONINT <6       Imaging:  DX-CHEST-PORTABLE (1 VIEW)   Final Result         1.  No acute cardiopulmonary disease.   2.  Atherosclerosis      CT-CTA NECK WITH & W/O-POST PROCESSING   Final Result         1.  No occlusion, aneurysm, dissection, or high-grade stenosis.   2.  Atherosclerosis         CT-CTA HEAD WITH & W/O-POST PROCESS   Final Result         1.  Short segment occlusion of the proximal left A2 segment with reconstitution.   2.  Small caliber of the right intracranial internal carotid artery at the level of the sella, could represent partial occlusion, hypoplastic segment, or vascular spasm   3.  3.3 mm proximal M2 branches from aneurysm   4.  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   5.  Referral to neuroradiology aneurysm clinic for further management.      These findings were discussed with the patient's clinician, Selvin Nowak, on 8/19/2022 9:41 PM.      CT-CEREBRAL PERFUSION ANALYSIS   Final Result         1.  Cerebral blood flow less than 30% likely representing completed infarct = 0 mL.      2.  T Max more than 6 seconds likely representing combination of completed  infarct and ischemia = 0 mL.      3.  Mismatched volume likely representing ischemic brain/penumbra = None      4.  Please note that the cerebral perfusion was performed on the limited brain tissue around the basal ganglia region. Infarct/ischemia outside the CT perfusion sections can be missed in this study.      CT-HEAD W/O   Final Result         1.  Subtle hyperdensity adjacent to the right caudate head, could represent subtle parenchymal hemorrhage or areas of subtle calcification. Could be further evaluated with MRI of the brain for more definitive characterization as clinically appropriate.   2.  Nonspecific white matter changes, commonly associated with small vessel ischemic disease.  Associated mild cerebral atrophy is noted.   3.  Atherosclerosis.      These findings were discussed with the patient's clinician, Selvin Nowak, on 8/19/2022 9:23 PM.      MR-BRAIN-W/O    (Results Pending)       X-Ray:  I have personally reviewed the images and compared with prior images.  EKG:  I have personally reviewed the images and compared with prior images.    Assessment/Plan:  Problem Representation:   57-year-old female with history of multiple CVAs in the past with chronic left-sided hemiparesis and left upper extremity contracture and severe left lower extremity weakness as well as chronic right-sided facial droop and chronic dysarthria who presented with with acute worsening of her dysarthria.  Neurology consulted with stroke alert however CTA head and neck relatively unremarkable and CT head with possible concern for hyperdense lesion on the right caudate head which could be hemorrhage but most likely calcification.  Patient had blood pressures up to the 220s over 100s which was improved with IV antihypertensives.  Unclear if patient is having new stroke versus hypertensive emergency as etiology of her clinical presentation.  Plan for admission and further work-up as well as optimizing blood pressure.  Of  note patient does not take any home medications but is willing to start.    I anticipate this patient will require at least two midnights for appropriate medical management, necessitating inpatient admission because hypertensive emergency    Patient will need a Telemetry bed on TELEMETRY service .  The need is secondary to hypertensive emergency.    * Hypertensive emergency- (present on admission)  Assessment & Plan  BP up to the 220s over 100s on presentation  Does not appear to be acute stroke  BP improved with IV antihypertensives  Not on home medications, but willing to start  EKG sinus rhythm and did not meet criteria for LVH    -IV as needed antihypertensives with goal BP <160/110  -Start on Losartan 25mg daily + Amlodipine 5mg daily  -MRI brain    Dysarthria following cerebral infarction  Assessment & Plan  Acute on chronic dysarthria  Patient was in hypertensive emergency which may be the etiology of her symptoms given lack of otherwise no focal deficits from baseline  CT head did show possible concern for small punctate hemorrhage in the right caudate head but most likely consistent with calcification    -MRI brain ordered to further assess  -High intensity statin  -Lipid panel  -Blood pressure control with goal <160/110  -No antiplatelet or anticoagulation at this time, in case CT head finding is truly a bleed      History of CVA with residual deficit  Assessment & Plan  History of CVA x4 per patient  Chronic left sided hemiparesis with contractures and multiple side  CT head possible small punctate hemorrhage in the right caudate head versus calcification  CTA head and neck relatively unremarkable for presenting symptoms  Not on home medications but willing to start    -High intensity statin  -Holding antiplatelet at this time given CT head finding  -MRI brain ordered to further assess we will optimize medical management after results      Leukocytosis  Assessment & Plan  UA with glucosuria many bacteria  however no urinary symptoms  Chest x-ray no acute cardiopulmonary abnormality  No acute signs or symptoms of infectious process at this time  Afebrile on admission, WBC of 12k    -Likely reactive in the setting of hypertensive emergency    Uncontrolled diabetes mellitus (HCC)  Assessment & Plan  A1c 9.1%  Not on any home medication  Blood glucose 442 on admission  Serum osm 319  Bicarb 22    -IV regular insulin bolus 0.1 units/kg  -Basal insulin  -Sliding scale insulin every 4 hours with POC glucose monitoring every 4 hours until hyperglycemia improved  -N.p.o. until hypoglycemia improved then start on diabetic diet and adjust sliding scale and POC glucose monitoring to be 3 times daily AC  -Will need diabetes education counseling    Lactic acidosis  Assessment & Plan  LA 3.1 on admission  Status post 1 L of NS in the ED  Troponin negative  CXR no acute process  UA asymptomatic bacteruria  Unclear etiology    -Trend lactic  -IV fluids as needed    Asymptomatic bacteriuria  Assessment & Plan  UA with many bacteria  Asymptomatic  No treatment necessary        VTE prophylaxis: SCDs/TEDs

## 2022-08-20 NOTE — ASSESSMENT & PLAN NOTE
History of CVA x4 per patient. Has diffuse ischemic disease.   Chronic left sided hemiparesis with contractures, facial asymmetry and dysarthria.   -High intensity statin  - BP control  - PT/OT  - Speech therapy romainal

## 2022-08-20 NOTE — ED NOTES
Med Rec Complete per patient's spouse at the bedside  Allergies Reviewed with patient's spouse  No antibiotics within the last 30 days  Patient's Preferred Pharmacy: [facility]      The spouse reports that they moved from [place], [place] in 2017 and have not seen a doctor since they arrived to [place].  The spouse reports that his wife does not have any active prescriptions currently.

## 2022-08-20 NOTE — CONSULTS
"Neurology STROKE CODE H&P  Neurohospitalist Service, [facility]    Referring Physician: Selvin Nowak    STROKE CODE:   Chief Complaint   Patient presents with    Possible Stroke     LKW 1900. Baseline L sided weakness from prior stroke. Acute \"difficulty speaking\"/slurred speech per , currently can answer questions but slow to respond. No new focal/unilateral deficits. .       To obtain the most accurate data regarding the time called, and time patient seen, refer to the stroke run-sheet and chart.  For time of CT, refer to the radiology report. See A&P below for TPA Decision and door to needle time if and when applicable.    HPI: Limited history.  Patient is unable to provide detailed history.  Per the EMS patient has a long history of multiple strokes.  Resulted in left-sided spastic hemiparesis.  Patient is wheelchair-bound only.  EMS was called this afternoon by her  due to onset of slurred speech and decreased responsiveness.  Onset approximately 2 hours ago around 7 PM.  Blood pressure is highly elevated over 200s on arrival here.  Blood sugar is also elevated over 400.  Patient does not take any medications due to choice.  Patient refuses all medications at home.      Review of systems: In addition to what is detailed in the HPI above, (and scanned into the chart if and when applicable), all other systems reviewed and are negative.    Past Medical History:   Unknown  FHx:  Unknown  SHx:  Unknown    Allergies:  Denies  Medications:  No current facility-administered medications for this encounter.  No current outpatient medications on file.    Physical Examination:    Vitals:    08/19/22 2115   Weight: 49.9 kg (110 lb)   Height: 1.499 m (4' 11\")       General: Patient is awake and in no acute distress  Eyes: Unremarkable CV: RRR    NEUROLOGICAL EXAM:     Mental status: Patient is awake.  Will follow some simple commands.  Speech and language: Speech is slurred. "  She will tell her name.  Answer some yes/no questions.  English is not her first language though.    Cranial nerve exam: Pupils are equal reactive.  Slight gaze preference to the right.  Able to overcome it.  Sensation appears to be intact.  There is no nystagmus.  Face appears grossly symmetrical however patient does not follow enough to the smile.  Shoulder shrug decreased on the right compared to left.  Tongue is midline.    Motor exam: Sustained antigravity strength on the right arm and leg.  Left upper extremity has contractures in a flexed position.  Left t lower extremity is able to briefly get it off the bed.  She also has increased tone with the inverted foot  Sensory exam: On grossly has decree sensation on the left compared to right deep tendon reflexes:  2+ and symmetric. Toes down-going bilaterally.  Coordination: no ataxia   Gait: deferred due to weakness    NIH Stroke Scale:    1a. Level of Consciousness (Alert, drowsy, etc): 0= Alert    1b. LOC Questions (Month, age): 1= Answers one correctly    1c. LOC Commands (Open/close eyes make fist/let go): 0= Obeys both correctly    2.   Best Gaze (Eyes open - patient follows examiner's finger on face): 1= Partial gaze palay    3.   Visual Fields (introduce visual stimulus/threat to patient's field quadrants): 0= No visual loss  4.   Facial Paresis (Show teeth, raise eyebrows and squeeze eyes shut): 0= Normal     5a. Motor Arm - Left (Elevate arm to 90 degrees if patient is sitting, 45 degrees if  supine): 4= No movement    5b. Motor Arm - Right (Elevate arm to 90 degrees if patient is sitting, 45 degrees if supine): 0= No drift    6a. Motor Leg - Left (Elevate leg 30 degrees with patient supine): 3= No effort against gravity    6b. Motor Leg - Right  (Elevate leg 30 degrees with patient supine): 0= No drift    7.   Limb Ataxia (Finger-nose, heel down shin): 0= No ataxia    8.   Sensory (Pin prick to face, arm, trunk and leg - compare side to side): 1=  Partial loss    9.  Best Language (Name item, describe a picture and read sentences): 1= Mild to moderate aphasia    10. Dysarthria (Evaluate speech clarity by patient repeating listed words): 1= Mild to moderate slurring    11. Extinction and Inattention (Use information from prior testing to identify neglect or  double simultaneous stimuli testing): 1= Partial neglect    Total NIH Score: 13    Modified Carlisle Scale (MRS): 0 = No symptoms      Objective Data:    Labs:  No results found for: PROTHROMBTM, INR   No results found for: WBC, RBC, HEMOGLOBIN, HEMATOCRIT, MCV, MCH, MCHC, MPV, NEUTSPOLYS, LYMPHOCYTES, MONOCYTES, EOSINOPHILS, BASOPHILS, HYPOCHROMIA, ANISOCYTOSIS   No results found for: SODIUM, POTASSIUM, CHLORIDE, CO2, GLUCOSE, BUN, CREATININE, BUNCREATRAT, GLOMRATE   No results found for: CHOLSTRLTOT, LDL, HDL, TRIGLYCERIDE    No results found for: ALKPHOSPHAT, ASTSGOT, ALTSGPT, TBILIRUBIN     Imaging/Testing:  DX-CHEST-PORTABLE (1 VIEW)    (Results Pending)   CT-HEAD W/O    (Results Pending)   CT-CEREBRAL PERFUSION ANALYSIS    (Results Pending)   CT-CTA HEAD WITH & W/O-POST PROCESS    (Results Pending)   CT-CTA NECK WITH & W/O-POST PROCESSING    (Results Pending)         Assessment and Plan:    57-year-old female brought in as a stroke alert.  Patient has severe disabilities at baseline with left-sided hemiparesis with contractures multiple infarcts in the past.  She was brought in due to onset of slurred speech.  Nonspecific findings.  Did not recommend tPA given nonfocal exam.  The perfusion and CTA head and neck review appear to be unremarkable.  There is 1 hyperdense lesion of the right caudate head.  Unclear significance.  Only on one slice.  Radiology also unclear could be calcification versus a tiny bleed.  At this time we could assume the worst and treat the blood pressure as a bleed, it is small and mostly incidental and somewhat of an odd position for hypertensive bleed..  Agree with getting MRI  brain.    Plan:  1.  Keep blood pressure between 110s to 160 systolic.  2.  Get MRI brain.  3.  No antiplatelets or anticoagulation.  4.  Would not get the full stroke work-up until we get clarification with the patient's wishes.  Patient refuses to take medications at home at baseline.      The evaluation of the patient, and recommended management, was discussed with the resident staff.     This chart was partially generated using voice recognition technology and sound alike word replacement may be present, best efforts were made to make the chart accurate.    Aleksey Sheffield MD  Board Certified Neurology, ABPN  t) [phone]

## 2022-08-20 NOTE — ED PROVIDER NOTES
"ED Provider Note    Scribed for Selvin Nowak by Shai Enamorado. 8/19/2022  9:08 PM    Primary care provider: No primary care provider noted.  Means of arrival: EMS  History obtained from: Patient  History limited by: None    CHIEF COMPLAINT  Chief Complaint   Patient presents with    Possible Stroke     LKW 1900. Baseline L sided weakness from prior stroke. Acute \"difficulty speaking\"/slurred speech per , currently can answer questions but slow to respond. No new focal/unilateral deficits. .     HPI  Alberto Hunter is a 57 y.o. female with a history of 4 prior strokes with lasting left-sided deficits who presents to the Emergency Department via EMS as a code Stroke for evaluation of a headache onset 30 minutes ago. She also has slurred speech, but denies any head pain, chest pain, or abdominal pain. Per EMS, she is hypertensive with a blood pressure of 200's over 100's and a blood sugar of 495. Her  notes that she was last seen normal 2 hours ago. He reports calling an ambulance because she was \"acting abnormal\". She denies taking any medications, as she typically refuses to take them. She agrees to receive medication at this time.  Quality:Aching  Duration: 2 Hours  Severity: Moderate  Associated sx: Slurred speech, chronic left-sided deficits.    REVIEW OF SYSTEMS  As above, all other systems reviewed and are negative.   See HPI for further details.     PAST MEDICAL HISTORY   4 Prior strokes    SURGICAL HISTORY  patient denies any surgical history  SOCIAL HISTORY      Social History     Substance and Sexual Activity   Drug Use Not noted     FAMILY HISTORY  No family history noted.    CURRENT MEDICATIONS  No current outpatient medications     ALLERGIES  Not noted.    PHYSICAL EXAM    VITAL SIGNS:   Vitals:    08/20/22 0008 08/20/22 0030 08/20/22 0100 08/20/22 0200   BP: (!) 174/89 (!) 174/89 (!) 172/89 (!) 165/86   Pulse: (!) 109 (!) 108 (!) 102 (!) 104   Resp: 20 20 20 20   Temp:     "   TempSrc:       SpO2: 96% 96% 97% 95%   Weight:       Height:         Vitals: My interpretation: Hypertensive, not tachycardic, afebrile, not hypoxic    Reinterpretation of vitals: Hypertensive, tachycardic, not hypoxic    Cardiac Monitor Interpretation: The cardiac monitor revealed normal Sinus Rhythm with tachycardia as interpreted by me. The cardiac monitor was ordered secondary to the patient's history of tachycardic and to monitor for dysrhythmia and/or tachycardia.    PE:   Constitutional: Appears acutely ill, minimally interactive  HENT: Normocephalic, Atraumatic, Bilateral external ears normal, Oropharynx is clear mucous membranes are moist. No oral exudates or nasal discharge.   Eyes: Pupils are equal round and reactive, EOMI, Conjunctiva normal, No discharge.   Neck: Normal range of motion, No tenderness, Supple, No stridor. No meningismus.  Lymphatic: No lymphadenopathy noted.   Cardiovascular: Tachycardic rate and rhythm without murmur rub or gallop.  Thorax & Lungs: Clear breath sounds bilaterally without wheezes, rhonchi or rales. There is no chest wall tenderness.   Abdomen: Soft non-tender non-distended. There is no rebound or guarding. No organomegaly is appreciated. Bowel sounds are normal.  Skin: Normal without rash.   Back: No CVA or spinal tenderness.   Extremities: Intact distal pulses, No edema, No tenderness, No cyanosis, No clubbing. Capillary refill is less than 2 seconds.  Musculoskeletal: Chronic left-sided weakness and contraction of left upper left lower extremity from prior stroke  Neurologic: Alert & oriented x 2, appears to have mild aphasia but very difficult to get an exam from the patient, she has left-sided deficits from prior stroke, chronic left upper extremity contraction, left lower extremity weakness, is tracking with her eyes, no obvious neglect, right upper extremity is unremarkable as it is right lower extremity and she follows commands with these.  psychiatric: Untested  DIAGNOSTIC STUDIES / PROCEDURES    LABS  Results for orders placed or performed during the hospital encounter of 08/19/22   CBC WITH DIFFERENTIAL   Result Value Ref Range    WBC 12.9 (H) 4.8 - 10.8 K/uL    RBC 5.03 4.20 - 5.40 M/uL    Hemoglobin 15.5 12.0 - 16.0 g/dL    Hematocrit 42.9 37.0 - 47.0 %    MCV 85.3 81.4 - 97.8 fL    MCH 30.8 27.0 - 33.0 pg    MCHC 36.1 (H) 33.6 - 35.0 g/dL    RDW 38.5 35.9 - 50.0 fL    Platelet Count 201 164 - 446 K/uL    MPV 10.6 9.0 - 12.9 fL    Neutrophils-Polys 89.20 (H) 44.00 - 72.00 %    Lymphocytes 6.00 (L) 22.00 - 41.00 %    Monocytes 4.10 0.00 - 13.40 %    Eosinophils 0.00 0.00 - 6.90 %    Basophils 0.20 0.00 - 1.80 %    Immature Granulocytes 0.50 0.00 - 0.90 %    Nucleated RBC 0.00 /100 WBC    Neutrophils (Absolute) 11.50 (H) 2.00 - 7.15 K/uL    Lymphs (Absolute) 0.78 (L) 1.00 - 4.80 K/uL    Monos (Absolute) 0.53 0.00 - 0.85 K/uL    Eos (Absolute) 0.00 0.00 - 0.51 K/uL    Baso (Absolute) 0.03 0.00 - 0.12 K/uL    Immature Granulocytes (abs) 0.07 0.00 - 0.11 K/uL    NRBC (Absolute) 0.00 K/uL   COMP METABOLIC PANEL   Result Value Ref Range    Sodium 141 135 - 145 mmol/L    Potassium 5.2 3.6 - 5.5 mmol/L    Chloride 104 96 - 112 mmol/L    Co2 22 20 - 33 mmol/L    Anion Gap 15.0 7.0 - 16.0    Glucose 442 (H) 65 - 99 mg/dL    Bun 16 8 - 22 mg/dL    Creatinine 0.63 0.50 - 1.40 mg/dL    Calcium 9.7 8.5 - 10.5 mg/dL    AST(SGOT) 41 12 - 45 U/L    ALT(SGPT) 27 2 - 50 U/L    Alkaline Phosphatase 101 (H) 30 - 99 U/L    Total Bilirubin 0.5 0.1 - 1.5 mg/dL    Albumin 4.8 3.2 - 4.9 g/dL    Total Protein 8.1 6.0 - 8.2 g/dL    Globulin 3.3 1.9 - 3.5 g/dL    A-G Ratio 1.5 g/dL   PROTHROMBIN TIME   Result Value Ref Range    PT 11.7 (L) 12.0 - 14.6 sec    INR 0.86 (L) 0.87 - 1.13   APTT   Result Value Ref Range    APTT 23.3 (L) 24.7 - 36.0 sec   COD (ADULT)   Result Value Ref Range    ABO Grouping Only B     Rh Grouping Only POS     Antibody Screen-Cod NEG    TROPONIN   Result Value Ref Range     Troponin T <6 6 - 19 ng/L   ABO Rh Confirm   Result Value Ref Range    ABO Rh Confirm B POS    URINALYSIS CULTURE, IF INDICATED    Specimen: Blood   Result Value Ref Range    Color Yellow     Character Cloudy (A)     Specific Gravity 1.040 <1.035    Ph 6.5 5.0 - 8.0    Glucose 500 (A) Negative mg/dL    Ketones Negative Negative mg/dL    Protein Negative Negative mg/dL    Bilirubin Negative Negative    Urobilinogen, Urine 0.2 Negative    Nitrite Negative Negative    Leukocyte Esterase Negative Negative    Occult Blood Negative Negative    Micro Urine Req Microscopic    ESTIMATED GFR   Result Value Ref Range    GFR (CKD-EPI) 103 >60 mL/min/1.73 m 2   LACTIC ACID   Result Value Ref Range    Lactic Acid 3.1 (H) 0.5 - 2.0 mmol/L   URINE MICROSCOPIC (W/UA)   Result Value Ref Range    WBC 2-5 /hpf    RBC 0-2 /hpf    Bacteria Many (A) None /hpf    Epithelial Cells Negative /hpf    Hyaline Cast 0-2 /lpf   OSMOLALITY SERUM   Result Value Ref Range    Osmolality Serum 319 (H) 278 - 298 mOsm/kg H2O   HEMOGLOBIN A1C   Result Value Ref Range    Glycohemoglobin 9.1 (H) 4.0 - 5.6 %    Est Avg Glucose 214 mg/dL   MAGNESIUM   Result Value Ref Range    Magnesium 1.9 1.5 - 2.5 mg/dL   PHOSPHORUS   Result Value Ref Range    Phosphorus 4.3 2.5 - 4.5 mg/dL   EKG (NOW)   Result Value Ref Range    Report       [facility] Emergency Dept.    Test Date:  2022  Pt Name:    HORACE OJEDA               Department: ER  MRN:        [id number]                      Room:       [facility]  Gender:     Female                       Technician: [id number]  :        1965                   Requested By:SELVIN JETER  Order #:    [id number]                    Reading MD: Selvin Jeter    Measurements  Intervals                                Axis  Rate:       88                           P:          50  IA:                                      QRS:        -4  QRSD:       83                           T:          185  QT:          400  QTc:        484    Interpretive Statements  SINUS RHYTHM  Abnrm T, consider ischemia, anterolateral lds  No previous ECG available for comparison  Electronically Signed On 8- 21:45:21 PDT by Selvin Nowak        All labs reviewed by me. Significant for leukocytosis of 13, no anemia, normal electrolytes, normal renal function, hyperglycemic to 442, PT/INR normal, troponin negative, urinalysis negative for ketones, nitrate or leukocyte esterase but does have bacteria, no epithelial cells and no WBCs, lactic acid of 3.1    RADIOLOGY  DX-CHEST-PORTABLE (1 VIEW)   Final Result         1.  No acute cardiopulmonary disease.   2.  Atherosclerosis      CT-CTA NECK WITH & W/O-POST PROCESSING   Final Result         1.  No occlusion, aneurysm, dissection, or high-grade stenosis.   2.  Atherosclerosis         CT-CTA HEAD WITH & W/O-POST PROCESS   Final Result         1.  Short segment occlusion of the proximal left A2 segment with reconstitution.   2.  Small caliber of the right intracranial internal carotid artery at the level of the sella, could represent partial occlusion, hypoplastic segment, or vascular spasm   3.  3.3 mm proximal M2 branches from aneurysm   4.  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   5.  Referral to neuroradiology aneurysm clinic for further management.      These findings were discussed with the patient's clinician, Selvin Nowak, on 8/19/2022 9:41 PM.      CT-CEREBRAL PERFUSION ANALYSIS   Final Result         1.  Cerebral blood flow less than 30% likely representing completed infarct = 0 mL.      2.  T Max more than 6 seconds likely representing combination of completed infarct and ischemia = 0 mL.      3.  Mismatched volume likely representing ischemic brain/penumbra = None      4.  Please note that the cerebral perfusion was performed on the limited brain tissue around the basal ganglia region. Infarct/ischemia  outside the CT perfusion sections can be missed in this study.      CT-HEAD W/O   Final Result         1.  Subtle hyperdensity adjacent to the right caudate head, could represent subtle parenchymal hemorrhage or areas of subtle calcification. Could be further evaluated with MRI of the brain for more definitive characterization as clinically appropriate.   2.  Nonspecific white matter changes, commonly associated with small vessel ischemic disease.  Associated mild cerebral atrophy is noted.   3.  Atherosclerosis.      These findings were discussed with the patient's clinician, Selvin Nowak, on 8/19/2022 9:23 PM.      MR-BRAIN-W/O    (Results Pending)     The radiologist's interpretation of all radiological studies have been reviewed by me.    COURSE & MEDICAL DECISION MAKING  Nursing notes, VS, PMSFHx, labs, imaging, EKG reviewed in chart.    Heart Score: Low    MDM: 9:08 PM Alberto Hunter is a 57 y.o. female who presented with evaluation as field stroke alert.   states that the patient been acting slightly abnormal all day but 2 hours ago he noticed an acute change with patient having difficulty with speech.  History of 4 prior strokes reported from EMS.  Patient has refused all medications though and currently is not on any medications reported from EMS.  Upon arrival here to the emergency department her exam is extremely difficult.  I met the patient at the triage desk with neurology.  Patient has baseline chronic neurologic deficits of left upper extremity and lower extremity weakness and contraction.  Her exam is difficult but she does follow commands on the right side upper and lower extremities has no focal new deficits psych appreciated other than some mild difficulty with speech, possible aphasia.  She was taken to CT scanner and I was contacted by the radiologist that she had a hyper dense area of the right caudate head that could represent hemorrhage at this time he would recommend against  tPA based on his reading of possible hemorrhage.  Unclear whether patient would be consentable for tPA regardless as she has refused medications in the past.  Discussed with neurology team who agrees the patient is not a tPA candidate based on presentation, neurologic exam, prior history, and CT with question of possible small hemorrhagic lesion.  Patient's labs did show concern for lactic acidosis of 3.1, hyperglycemia, mild leukocytosis.  No obvious source of infection noted.  Chest x-ray negative.  EKG unremarkable.  Troponin negative.  Urinalysis negative for nitrite and leukocyte Estrace but does show small bacteria, no WBCs and likely does not represent infection as patient has no UTI symptoms on evaluation.  She was also found to have hypertensive emergency with systolic over 200, was treated with 20 mg IV hydralazine.  Blood pressure improved and her neurological exam improved with correlation with blood pressure.  On reevaluation patient is more alert and oriented.  Discussed with patient that we will admit for continued monitoring of a possible source of infection and is well as ordering an MRI for further evaluation.  Neurology will be consulting.  Hospitalist admitted the patient for further evaluation.    I discussed the patient's case and the above findings with Dr. Edwards (Neurology) who agrees the patient is not a tPa candidate at this time.     9:42 PM - I discussed the patient's case and the above findings with Dr. De Oliveira (Radiology) who notes an area of hyperdensity that could be concerning for small hemorrage, would recommend not giving tPa. He also notes vessel disease and poor vasculature throughout.    11:56 PM - Paged Hospitalist.    12:11 AM - I discussed the patient's case and the above findings with Dr. Bronson  ([facility]) who agrees to evaluate the patient for hospitalization.     HYDRATION: Based on the patient's presentation of Dehydration, Eldery ALOC, and  Tachycardia the patient was given IV fluids. IV Hydration was used because oral hydration was not adequate alone. Upon recheck following hydration, the patient was improved.     CRITICAL CARE TIME 35 minutes: Acute hypertensive emergency with neurological changes requiring IV administration of antihypertensives, stroke alert, admission, neurology consultation, consideration of tPA  There was a very real possibility of deterioration of the patient's condition.  This patient required the highest level of care.  I provided critical care services which included: review of the medical record, treatment orders, ordering and reviewing test results, frequent reevaluation of the patient's condition and response to treatment, as well as discussing the case with appropriate personnel and various consultants. The critical care time associated with the care of this patient is exclusive of any procedures or specific interventions.     FINAL IMPRESSION  1. Hypertensive emergency Acute   2. Dysarthria Acute   3. Stroke-like symptom Acute      Shai HINDS (Maricel), am scribing for, and in the presence of, Selvin Nowak.    Electronically signed by: Shai Enamorado (Maricel), 8/19/2022    ISelvin personally performed the services described in this documentation, as scribed by Shai Enamorado in my presence, and it is both accurate and complete. C.    The note accurately reflects work and decisions made by me.  Selvin Nowak  8/20/2022  2:48 AM

## 2022-08-20 NOTE — ASSESSMENT & PLAN NOTE
Acute on chronic dysarthria  Patient was in hypertensive emergency which may be the etiology of her symptoms given lack of otherwise no focal deficits from baseline  CT head did show possible concern for small punctate hemorrhage in the right caudate head but most likely consistent with calcification    -MRI brain ordered to further assess  -High intensity statin  -Lipid panel  -Blood pressure control with goal <160/110  -No antiplatelet or anticoagulation at this time, in case CT head finding is truly a bleed

## 2022-08-20 NOTE — ED TRIAGE NOTES
"Name  57 y.o.  female  Chief Complaint   Patient presents with    Possible Stroke     LKW 1900. Baseline L sided weakness from prior stroke. Acute \"difficulty speaking\"/slurred speech per , currently can answer questions but slow to respond. No new focal/unilateral deficits. .     Hypertensive with REMSA, 205/98. To CT from charge desk after ERP & neurologist evaluation.  "

## 2022-08-21 PROBLEM — E87.6 HYPOKALEMIA: Status: ACTIVE | Noted: 2022-08-21

## 2022-08-21 LAB
ALBUMIN SERPL BCP-MCNC: 3.9 G/DL (ref 3.2–4.9)
ALBUMIN SERPL BCP-MCNC: 4.2 G/DL (ref 3.2–4.9)
ALBUMIN/GLOB SERPL: 1.4 G/DL
ALBUMIN/GLOB SERPL: 1.5 G/DL
ALP SERPL-CCNC: 77 U/L (ref 30–99)
ALP SERPL-CCNC: 84 U/L (ref 30–99)
ALT SERPL-CCNC: 16 U/L (ref 2–50)
ALT SERPL-CCNC: 16 U/L (ref 2–50)
ANION GAP SERPL CALC-SCNC: 13 MMOL/L (ref 7–16)
ANION GAP SERPL CALC-SCNC: 18 MMOL/L (ref 7–16)
AST SERPL-CCNC: 17 U/L (ref 12–45)
AST SERPL-CCNC: 18 U/L (ref 12–45)
BASOPHILS # BLD AUTO: 0.2 % (ref 0–1.8)
BASOPHILS # BLD: 0.03 K/UL (ref 0–0.12)
BILIRUB SERPL-MCNC: 0.7 MG/DL (ref 0.1–1.5)
BILIRUB SERPL-MCNC: 1 MG/DL (ref 0.1–1.5)
BUN SERPL-MCNC: 14 MG/DL (ref 8–22)
BUN SERPL-MCNC: 18 MG/DL (ref 8–22)
CALCIUM SERPL-MCNC: 9.2 MG/DL (ref 8.5–10.5)
CALCIUM SERPL-MCNC: 9.4 MG/DL (ref 8.5–10.5)
CHLORIDE SERPL-SCNC: 105 MMOL/L (ref 96–112)
CHLORIDE SERPL-SCNC: 108 MMOL/L (ref 96–112)
CHOLEST SERPL-MCNC: 255 MG/DL (ref 100–199)
CO2 SERPL-SCNC: 18 MMOL/L (ref 20–33)
CO2 SERPL-SCNC: 24 MMOL/L (ref 20–33)
CREAT SERPL-MCNC: 0.62 MG/DL (ref 0.5–1.4)
CREAT SERPL-MCNC: 0.99 MG/DL (ref 0.5–1.4)
EOSINOPHIL # BLD AUTO: 0.02 K/UL (ref 0–0.51)
EOSINOPHIL NFR BLD: 0.1 % (ref 0–6.9)
ERYTHROCYTE [DISTWIDTH] IN BLOOD BY AUTOMATED COUNT: 40.9 FL (ref 35.9–50)
GFR SERPLBLD CREATININE-BSD FMLA CKD-EPI: 104 ML/MIN/1.73 M 2
GFR SERPLBLD CREATININE-BSD FMLA CKD-EPI: 66 ML/MIN/1.73 M 2
GLOBULIN SER CALC-MCNC: 2.8 G/DL (ref 1.9–3.5)
GLOBULIN SER CALC-MCNC: 2.8 G/DL (ref 1.9–3.5)
GLUCOSE BLD STRIP.AUTO-MCNC: 110 MG/DL (ref 65–99)
GLUCOSE BLD STRIP.AUTO-MCNC: 116 MG/DL (ref 65–99)
GLUCOSE BLD STRIP.AUTO-MCNC: 119 MG/DL (ref 65–99)
GLUCOSE BLD STRIP.AUTO-MCNC: 143 MG/DL (ref 65–99)
GLUCOSE BLD STRIP.AUTO-MCNC: 160 MG/DL (ref 65–99)
GLUCOSE SERPL-MCNC: 110 MG/DL (ref 65–99)
GLUCOSE SERPL-MCNC: 199 MG/DL (ref 65–99)
HCT VFR BLD AUTO: 39.5 % (ref 37–47)
HDLC SERPL-MCNC: 49 MG/DL
HGB BLD-MCNC: 14.2 G/DL (ref 12–16)
IMM GRANULOCYTES # BLD AUTO: 0.05 K/UL (ref 0–0.11)
IMM GRANULOCYTES NFR BLD AUTO: 0.4 % (ref 0–0.9)
LDLC SERPL CALC-MCNC: 183 MG/DL
LYMPHOCYTES # BLD AUTO: 2.93 K/UL (ref 1–4.8)
LYMPHOCYTES NFR BLD: 21.9 % (ref 22–41)
MAGNESIUM SERPL-MCNC: 2.2 MG/DL (ref 1.5–2.5)
MCH RBC QN AUTO: 30.9 PG (ref 27–33)
MCHC RBC AUTO-ENTMCNC: 35.9 G/DL (ref 33.6–35)
MCV RBC AUTO: 86.1 FL (ref 81.4–97.8)
MONOCYTES # BLD AUTO: 1.22 K/UL (ref 0–0.85)
MONOCYTES NFR BLD AUTO: 9.1 % (ref 0–13.4)
NEUTROPHILS # BLD AUTO: 9.13 K/UL (ref 2–7.15)
NEUTROPHILS NFR BLD: 68.3 % (ref 44–72)
NRBC # BLD AUTO: 0 K/UL
NRBC BLD-RTO: 0 /100 WBC
PHOSPHATE SERPL-MCNC: 3.9 MG/DL (ref 2.5–4.5)
PLATELET # BLD AUTO: 214 K/UL (ref 164–446)
PMV BLD AUTO: 10.3 FL (ref 9–12.9)
POTASSIUM SERPL-SCNC: 2.9 MMOL/L (ref 3.6–5.5)
POTASSIUM SERPL-SCNC: 3.4 MMOL/L (ref 3.6–5.5)
PROT SERPL-MCNC: 6.7 G/DL (ref 6–8.2)
PROT SERPL-MCNC: 7 G/DL (ref 6–8.2)
RBC # BLD AUTO: 4.59 M/UL (ref 4.2–5.4)
SODIUM SERPL-SCNC: 142 MMOL/L (ref 135–145)
SODIUM SERPL-SCNC: 144 MMOL/L (ref 135–145)
TRIGL SERPL-MCNC: 114 MG/DL (ref 0–149)
WBC # BLD AUTO: 13.4 K/UL (ref 4.8–10.8)

## 2022-08-21 PROCEDURE — 80061 LIPID PANEL: CPT

## 2022-08-21 PROCEDURE — 97162 PT EVAL MOD COMPLEX 30 MIN: CPT

## 2022-08-21 PROCEDURE — 80053 COMPREHEN METABOLIC PANEL: CPT

## 2022-08-21 PROCEDURE — A9270 NON-COVERED ITEM OR SERVICE: HCPCS | Performed by: STUDENT IN AN ORGANIZED HEALTH CARE EDUCATION/TRAINING PROGRAM

## 2022-08-21 PROCEDURE — 770020 HCHG ROOM/CARE - TELE (206)

## 2022-08-21 PROCEDURE — 700102 HCHG RX REV CODE 250 W/ 637 OVERRIDE(OP): Performed by: STUDENT IN AN ORGANIZED HEALTH CARE EDUCATION/TRAINING PROGRAM

## 2022-08-21 PROCEDURE — 85025 COMPLETE CBC W/AUTO DIFF WBC: CPT

## 2022-08-21 PROCEDURE — 82962 GLUCOSE BLOOD TEST: CPT | Mod: 91

## 2022-08-21 PROCEDURE — 700101 HCHG RX REV CODE 250: Performed by: STUDENT IN AN ORGANIZED HEALTH CARE EDUCATION/TRAINING PROGRAM

## 2022-08-21 PROCEDURE — 36415 COLL VENOUS BLD VENIPUNCTURE: CPT

## 2022-08-21 PROCEDURE — 83735 ASSAY OF MAGNESIUM: CPT

## 2022-08-21 PROCEDURE — 700111 HCHG RX REV CODE 636 W/ 250 OVERRIDE (IP): Performed by: STUDENT IN AN ORGANIZED HEALTH CARE EDUCATION/TRAINING PROGRAM

## 2022-08-21 PROCEDURE — 99233 SBSQ HOSP IP/OBS HIGH 50: CPT | Mod: GC | Performed by: HOSPITALIST

## 2022-08-21 PROCEDURE — 84100 ASSAY OF PHOSPHORUS: CPT

## 2022-08-21 RX ORDER — ATORVASTATIN CALCIUM 80 MG/1
80 TABLET, FILM COATED ORAL EVERY EVENING
Status: DISCONTINUED | OUTPATIENT
Start: 2022-08-21 | End: 2022-08-22 | Stop reason: HOSPADM

## 2022-08-21 RX ORDER — LOSARTAN POTASSIUM 25 MG/1
25 TABLET ORAL ONCE
Status: COMPLETED | OUTPATIENT
Start: 2022-08-21 | End: 2022-08-21

## 2022-08-21 RX ORDER — ASPIRIN 81 MG/1
81 TABLET, CHEWABLE ORAL DAILY
Status: DISCONTINUED | OUTPATIENT
Start: 2022-08-21 | End: 2022-08-22 | Stop reason: HOSPADM

## 2022-08-21 RX ORDER — AMLODIPINE BESYLATE 5 MG/1
5 TABLET ORAL
Status: DISCONTINUED | OUTPATIENT
Start: 2022-08-21 | End: 2022-08-22 | Stop reason: HOSPADM

## 2022-08-21 RX ORDER — SODIUM CHLORIDE AND POTASSIUM CHLORIDE 300; 900 MG/100ML; MG/100ML
1000 INJECTION, SOLUTION INTRAVENOUS CONTINUOUS
Status: DISPENSED | OUTPATIENT
Start: 2022-08-21 | End: 2022-08-21

## 2022-08-21 RX ORDER — INSULIN LISPRO 100 [IU]/ML
2-9 INJECTION, SOLUTION INTRAVENOUS; SUBCUTANEOUS EVERY 4 HOURS
Status: DISCONTINUED | OUTPATIENT
Start: 2022-08-21 | End: 2022-08-22 | Stop reason: HOSPADM

## 2022-08-21 RX ORDER — POTASSIUM CHLORIDE 20 MEQ/1
40 TABLET, EXTENDED RELEASE ORAL EVERY 4 HOURS
Status: COMPLETED | OUTPATIENT
Start: 2022-08-21 | End: 2022-08-21

## 2022-08-21 RX ORDER — ENOXAPARIN SODIUM 100 MG/ML
30 INJECTION SUBCUTANEOUS DAILY
Status: DISCONTINUED | OUTPATIENT
Start: 2022-08-21 | End: 2022-08-22 | Stop reason: HOSPADM

## 2022-08-21 RX ORDER — POTASSIUM CHLORIDE 7.45 MG/ML
10 INJECTION INTRAVENOUS
Status: DISCONTINUED | OUTPATIENT
Start: 2022-08-21 | End: 2022-08-21 | Stop reason: ALTCHOICE

## 2022-08-21 RX ADMIN — DOCUSATE SODIUM 50 MG AND SENNOSIDES 8.6 MG 2 TABLET: 8.6; 5 TABLET, FILM COATED ORAL at 17:27

## 2022-08-21 RX ADMIN — ASPIRIN 81 MG: 81 TABLET, CHEWABLE ORAL at 12:17

## 2022-08-21 RX ADMIN — LOSARTAN POTASSIUM 25 MG: 25 TABLET, FILM COATED ORAL at 11:32

## 2022-08-21 RX ADMIN — POTASSIUM CHLORIDE 40 MEQ: 1500 TABLET, EXTENDED RELEASE ORAL at 17:27

## 2022-08-21 RX ADMIN — ATORVASTATIN CALCIUM 80 MG: 80 TABLET, FILM COATED ORAL at 17:27

## 2022-08-21 RX ADMIN — ENOXAPARIN SODIUM 30 MG: 30 INJECTION SUBCUTANEOUS at 17:27

## 2022-08-21 RX ADMIN — POTASSIUM CHLORIDE 40 MEQ: 1500 TABLET, EXTENDED RELEASE ORAL at 12:17

## 2022-08-21 RX ADMIN — LOSARTAN POTASSIUM 50 MG: 50 TABLET, FILM COATED ORAL at 05:31

## 2022-08-21 RX ADMIN — POTASSIUM CHLORIDE AND SODIUM CHLORIDE 1000 ML: 900; 300 INJECTION, SOLUTION INTRAVENOUS at 11:35

## 2022-08-21 RX ADMIN — POTASSIUM CHLORIDE 10 MEQ: 7.45 INJECTION INTRAVENOUS at 09:30

## 2022-08-21 RX ADMIN — LABETALOL HYDROCHLORIDE 10 MG: 5 INJECTION, SOLUTION INTRAVENOUS at 15:19

## 2022-08-21 RX ADMIN — AMLODIPINE BESYLATE 5 MG: 5 TABLET ORAL at 21:23

## 2022-08-21 RX ADMIN — DOCUSATE SODIUM 50 MG AND SENNOSIDES 8.6 MG 2 TABLET: 8.6; 5 TABLET, FILM COATED ORAL at 05:31

## 2022-08-21 ASSESSMENT — COGNITIVE AND FUNCTIONAL STATUS - GENERAL
WALKING IN HOSPITAL ROOM: A LOT
TURNING FROM BACK TO SIDE WHILE IN FLAT BAD: A LOT
MOVING FROM LYING ON BACK TO SITTING ON SIDE OF FLAT BED: A LOT
SUGGESTED CMS G CODE MODIFIER MOBILITY: CL
CLIMB 3 TO 5 STEPS WITH RAILING: TOTAL
STANDING UP FROM CHAIR USING ARMS: A LITTLE
MOVING TO AND FROM BED TO CHAIR: UNABLE
MOBILITY SCORE: 11

## 2022-08-21 ASSESSMENT — GAIT ASSESSMENTS: GAIT LEVEL OF ASSIST: UNABLE TO PARTICIPATE

## 2022-08-21 ASSESSMENT — PAIN DESCRIPTION - PAIN TYPE: TYPE: ACUTE PAIN;CHRONIC PAIN

## 2022-08-21 ASSESSMENT — FIBROSIS 4 INDEX: FIB4 SCORE: 1.13

## 2022-08-21 NOTE — ASSESSMENT & PLAN NOTE
Acute on chronic dysarthria  Patient was in hypertensive emergency which may be the etiology of her symptoms given lack of otherwise no focal deficits from baseline  CT head did show possible concern for small punctate hemorrhage in the right caudate head but most likely consistent with calcification    -MRI brain demonstrated chronic multiple infarcts, no acute infarct or hemorrhage  -High intensity statin  -Lipid panel  -Blood pressure control with goal <160/110  -started ASA 81mg for secondary prevention  -chronic dysarthria and LUE and LLE paralysis  -PT rec SNF  -SLP approved diet

## 2022-08-21 NOTE — ASSESSMENT & PLAN NOTE
BP up to the 220s over 100s on presentation  Per the patient she had worsening weakness and speech.   MRI negative for acute bleeding/ischemia. Has diffuse atherosclerosis and small vessel ischemia.   No tele events.   -Started oral meds, increased losartan to 75mg, added amodipine 5mg  -IV as needed antihypertensives with goal BP <160/110  -Monitor, tele   - Strict I and Os  MRI brain neg for acute CVA though demonstrated chronic old CVAs and hemorrhage

## 2022-08-21 NOTE — PROGRESS NOTES
Assumed care of patient, bedside report received from Edward day shift RN. Updated on plan of care, call light within reach and fall precautions are in place and bed lock and in lowest position. Patient instructed to call for assistance before getting out of bed. All questions answered at this time. No other needs.

## 2022-08-21 NOTE — ASSESSMENT & PLAN NOTE
"History of CVA x4 per patient. Has diffuse ischemic disease.   Chronic left sided hemiparesis with contractures, facial asymmetry and dysarthria.   -High intensity statin  - BP control  - PT/OT  - Speech therapy eval  -see \"dysarthria\"      "

## 2022-08-21 NOTE — PROGRESS NOTES
[facility] Internal Medicine Daily Progress Note    Date of Service  8/21/2022    [employer] Team: R IM Blue Team   Attending: Margoth Ugarte M.d.  Senior Resident: Dr. Padron  Intern:  Dr. Friedman   Contact Number: [phone]    Chief Complaint  Alberto Hunter is a 57 y.o. female admitted 8/19/2022 with acute weakness and debility.     Hospital Course    Patient with PMHx of multiple CVAs (#4) in the past, per the patient's , last was in 2010 and she has not seen a doctor since 7 years ago. She does not take medicines at home. From previous CVAs she has residual severe left sided weakness, with chronic LUE flexion and muscular contraction, unable to ambulate and almost total dependence. Per the patient she was feeling more slurred speech and weak, admitted with BP of 200/100 and suspicion of stroke. MRI negative for acute process.    Interval Problem Update  CT head with a right subtle hemorrage in the caudate, and small vessel ischemia, atherosclerosis. CTA with proximal left A2 occlusion and a small aneurism. CT Hand N with no significant stenosis. Patient passed swalloing test and has not new symptoms, no chest pain, urinating normally and BP btw 160-170/80-90. Did not started her PO meds.     Later on, MRI came back negaive for acute ischemic or hemorrhagic stroke. Positive for prior strokes.  Able to restart more strict BP control. She is tolerating PO. PT recommending SNF. Started secondary prevention w/ aspirin and increased statin    K 2.9 transiently likely cellular shifts from insulin, as no diarrhea or vomiting or other volume losses noted. Replacing K w/ IV and orals.     I have discussed this patient's plan of care and discharge plan at IDT rounds today with Case Management, Nursing, Nursing leadership, and other members of the IDT team.      Code Status  DNAR/DNI    Disposition  Patient is not medically cleared for discharge.   Anticipate discharge to intensive rehab inp/facility.   I have placed the  appropriate orders for post-discharge needs.    ROS:   General: No fevers, chills, night sweats, weight loss or gain.  H&N: No hearing changes, vision changes, eye pain, ear pain, tinnitus, nasal discharge, sore throat, no neck swelling.  Pulmonary: No shortness of breath, cough, sputum, or hemoptysis.  Cardiovascular: No chest pain, palpitations, or LE swelling.   GI: No nausea, vomiting, diarrhea, constipation, abdominal pain, hematochezia or melena.  : No dysuria, frequency, retention or hematuria.   Neuro: No headaches, no lightheadedness, no dizziness.       Physical Exam  Temp:  [36.1 °C (96.9 °F)-36.5 °C (97.7 °F)] 36.5 °C (97.7 °F)  Pulse:  [75-94] 94  Resp:  [16-20] 16  BP: (126-176)/() 141/104  SpO2:  [91 %-95 %] 95 %      General: Awake, alert and oriented.  Head and Neck: NC/AT, EOMI, no scleral icterus or conjunctival pallor, no nasal discharge or oral erythema or exudates. Neck supple, no cervical or supraclavicular LAD.   CV: Rhythmic heart sounds, no murmurs, gallops or rubs. No S3,S4 or JVD. Radial and dorsalis pedis pulses 2+ and equal bilaterally.   Pulm: Chest expansion is symmetrical, no crackles, rales, rhonchi, or wheezing.  GI: Flat, non distended, soft, nontender, normal bowel sounds.  Skin: Warm, no rashes, no evident lesions.  MSK: Normal ROM. No lower extremity edema. No pain, no lesions.   Neuro: Patient is alert and oriented x4. Speech is unclear, dysarthria noted, non-fluent. She comprehends, names and is clear, no aphasia. Pupils equal, round and reactive to light. Extra ocular movements intact. Facial assymetry with left facial droop and protruding tongue. Body asymmetry, flacid left LE and flexed and contracted LUE, hypertonic, increased DTR on the left side.  Sensitivity grossly intact.   Psych: Appropriate mood and affect.      Fluids    Intake/Output Summary (Last 24 hours) at 8/21/2022 1638  Last data filed at 8/21/2022 0500  Gross per 24 hour   Intake 220 ml   Output  100 ml   Net 120 ml       Laboratory  Recent Labs     08/19/22 2059 08/20/22  1150 08/21/22  0313   WBC 12.9* 10.5 13.4*   RBC 5.03 4.46 4.59   HEMOGLOBIN 15.5 13.9 14.2   HEMATOCRIT 42.9 38.2 39.5   MCV 85.3 85.7 86.1   MCH 30.8 31.2 30.9   MCHC 36.1* 36.4* 35.9*   RDW 38.5 39.6 40.9   PLATELETCT 201 233 214   MPV 10.6 10.2 10.3     Recent Labs     08/19/22 2059 08/20/22  1150 08/21/22  0313   SODIUM 141 144 142   POTASSIUM 5.2 3.4* 2.9*   CHLORIDE 104 108 105   CO2 22 18* 24   GLUCOSE 442* 199* 110*   BUN 16 14 18   CREATININE 0.63 0.62 0.99   CALCIUM 9.7 9.4 9.2     Recent Labs     08/19/22 2059   APTT 23.3*   INR 0.86*         Recent Labs     08/21/22 0313   TRIGLYCERIDE 114   HDL 49   *       Imaging  EC-ECHOCARDIOGRAM COMPLETE W/O CONT   Final Result      MR-BRAIN-W/O   Final Result      1.  No evidence of acute ischemia, hemorrhage or mass   2.  Remote BILATERAL basal ganglia infarctions with some areas of remote hemorrhage adjacent to the larger RIGHT-sided area of encephalomalacia   3.  Wallerian degeneration in the RIGHT cerebral peduncle   4.  Atrophy   5.  White matter changes      DX-CHEST-PORTABLE (1 VIEW)   Final Result         1.  No acute cardiopulmonary disease.   2.  Atherosclerosis      CT-CTA NECK WITH & W/O-POST PROCESSING   Final Result         1.  No occlusion, aneurysm, dissection, or high-grade stenosis.   2.  Atherosclerosis         CT-CTA HEAD WITH & W/O-POST PROCESS   Final Result         1.  Short segment occlusion of the proximal left A2 segment with reconstitution.   2.  Small caliber of the right intracranial internal carotid artery at the level of the sella, could represent partial occlusion, hypoplastic segment, or vascular spasm   3.  3.3 mm proximal M2 branches from aneurysm   4.  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   5.  Referral to neuroradiology aneurysm clinic for further management.      These  findings were discussed with the patient's clinician, Selvin Nowak, on 8/19/2022 9:41 PM.      CT-CEREBRAL PERFUSION ANALYSIS   Final Result         1.  Cerebral blood flow less than 30% likely representing completed infarct = 0 mL.      2.  T Max more than 6 seconds likely representing combination of completed infarct and ischemia = 0 mL.      3.  Mismatched volume likely representing ischemic brain/penumbra = None      4.  Please note that the cerebral perfusion was performed on the limited brain tissue around the basal ganglia region. Infarct/ischemia outside the CT perfusion sections can be missed in this study.      CT-HEAD W/O   Final Result         1.  Subtle hyperdensity adjacent to the right caudate head, could represent subtle parenchymal hemorrhage or areas of subtle calcification. Could be further evaluated with MRI of the brain for more definitive characterization as clinically appropriate.   2.  Nonspecific white matter changes, commonly associated with small vessel ischemic disease.  Associated mild cerebral atrophy is noted.   3.  Atherosclerosis.      These findings were discussed with the patient's clinician, Selvin Nowak, on 8/19/2022 9:23 PM.           Assessment/Plan  Problem Representation:    * Hypertensive emergency- (present on admission)  Assessment & Plan  BP up to the 220s over 100s on presentation  Per the patient she had worsening weakness and speech.   MRI negative for acute bleeding/ischemia. Has diffuse atherosclerosis and small vessel ischemia.   No tele events.   -Started oral meds, increased losartan to 75mg, added amodipine 5mg  -IV as needed antihypertensives with goal BP <160/110  -Monitor, tele   - Strict I and Os  MRI brain neg for acute CVA though demonstrated chronic old CVAs and hemorrhage      Hypokalemia  Assessment & Plan  -likely in setting of cellular shifts and insulin administration  -replete w/ IV and PO    Dysarthria following cerebral  "infarction  Assessment & Plan  Acute on chronic dysarthria  Patient was in hypertensive emergency which may be the etiology of her symptoms given lack of otherwise no focal deficits from baseline  CT head did show possible concern for small punctate hemorrhage in the right caudate head but most likely consistent with calcification    -MRI brain demonstrated chronic multiple infarcts, no acute infarct or hemorrhage  -High intensity statin  -Lipid panel  -Blood pressure control with goal <160/110  -started ASA 81mg for secondary prevention  -chronic dysarthria and LUE and LLE paralysis  -PT rec SNF  -SLP approved diet      History of CVA with residual deficit  Assessment & Plan  History of CVA x4 per patient. Has diffuse ischemic disease.   Chronic left sided hemiparesis with contractures, facial asymmetry and dysarthria.   -High intensity statin  - BP control  - PT/OT  - Speech therapy eval  -see \"dysarthria\"        Uncontrolled diabetes mellitus (HCC)  Assessment & Plan  A1c 9.1%  Not on any home medication  Blood glucose 442 on admission though rapidly improved    -Basal insulin to titrate to -160  -SSI and POCG  -Will need diabetes education counseling  - Diabetic diet     Lactic acidosis  Assessment & Plan  LA 3.1 on admission  Status post 1 L of NS in the ED  Troponin negative  UA asymptomatic bacteruria  Resolved       Asymptomatic bacteriuria  Assessment & Plan  UA with many bacteria  Asymptomatic  No treatment necessary       VTE prophylaxis: SCDs/TEDs and enoxaparin ppx    I have performed a physical exam and reviewed and updated ROS and Plan today (8/21/2022). In review of yesterday's note (8/20/2022), there are no changes except as documented above.      "

## 2022-08-21 NOTE — THERAPY
Physical Therapy   Initial Evaluation     Patient Name: Alberto Hunter  Age:  57 y.o., Sex:  female  Medical Record #: [id number]  Today's Date: 8/21/2022     Precautions  Precautions: Fall Risk  Comments: hx CVA with L side deficits    Assessment  Patient is a 57 y.o. female admitted following hypertensive emergency with BP in 200's/100's. PMH includes multiple CVA's with L side deficits. PT eval complete. Pt willing to mobilize OOB with PT and receptive to education. Upon sitting EOB, pt's BP was 176/88 and RN was notified; RN administered medication to lower BP and PT continued eval. Pt appears to be below her functional baseline with details down below. Pt states her  helps her with most functional mobility and he is home all day to assist as needed. Recommend post acute placement at this time to maximize functional independence and decrease caregiver burden. Anticipate that pt has potential to progress to HHPT level while admitted. Will need to talk to pt's  to assess how much help he is willing/able to provide at home and how much he is actually home during the day. If pt's  states he is able/willing to assist with all mobility, pt should be able to safely DC home with family assist. Will follow while admitted to address activity tolerance, mobility, strength, and balance.    Plan    Recommend Physical Therapy 4 times per week until therapy goals are met for the following treatments:  Bed Mobility, Neuro Re-Education / Balance, Self Care/Home Evaluation, Therapeutic Activities, and Therapeutic Exercises    DC Equipment Recommendations: Unable to determine at this time (possibly SPC)  Discharge Recommendations: Recommend post acute placement          08/21/22 1531   Charge Group   PT Evaluation PT Evaluation Mod   Total Time Spent   PT Total Time Yes   PT Evaluation Time Spent (Mins) 30   PT Total Time Spent (Calculated) 30   Initial Contact Note    Initial Contact Note Order Received and  Verified, Physical Therapy Evaluation in Progress with Full Report to Follow.   Precautions   Precautions Fall Risk   Comments hx CVA with L side deficits   Vitals   Patient BP Position Supine   Blood Pressure (!) 176/88   Pulse Oximetry 94 %   O2 (LPM) 0   O2 Delivery Device None - Room Air   Pain 0 - 10 Group   Therapist Pain Assessment   (no c/o pain)   Prior Living Situation   Prior Services Intermittent Physical Support for ADL Per Family   Housing / Facility Motel   Equipment Owned Wheelchair   Lives with - Patient's Self Care Capacity Spouse   Comments pt reports that her  works at night, but is able to assist during the day   Prior Level of Functional Mobility   Bed Mobility Required Assist   Transfer Status Required Assist   Ambulation Dependent   Assistive Devices Used Wheelchair   Wheelchair Required Assist   Stairs Dependent   Comments pt reports that she can usually get out of bed, but her  has to help her with all transfers and WC propulsion. pt does not ambulate or use stairs at baseline   Cognition    Cognition / Consciousness WDL   Level of Consciousness Alert   Comments pleasant and participatory, communication limited by aphasia   Active ROM Lower Body    Active ROM Lower Body  WDL   Strength Lower Body   Lower Body Strength  X   Comments generalized weakness + chronic L side deficits 2/2 CVA   Balance Assessment   Sitting Balance (Static) Fair +   Sitting Balance (Dynamic) Fair   Standing Balance (Static) Poor +   Standing Balance (Dynamic) Poor   Weight Shift Sitting Fair   Weight Shift Standing Poor   Comments HHA in standing   Gait Analysis   Gait Level Of Assist Unable to Participate   Level of Assist with Stairs Unable to Participate   Comments pt does not ambulate or climb stairs at baseline   Bed Mobility    Supine to Sit Moderate Assist   Sit to Supine Moderate Assist   Scooting Moderate Assist   Rolling Minimum Assist to Lt.   Comments HOB slightly elevated   Functional  Mobility   Sit to Stand Minimal Assist   Bed, Chair, Wheelchair Transfer Minimal Assist   Transfer Method Stand Pivot   Mobility EOB<>WC   Comments pt able to stand with very minimal assistance, but her LLE does give out a little. pt asked if she could demonstrate WC mobility, but she said no and moved back into bed   How much difficulty does the patient currently have...   Turning over in bed (including adjusting bedclothes, sheets and blankets)? 2   Sitting down on and standing up from a chair with arms (e.g., wheelchair, bedside commode, etc.) 2   Moving from lying on back to sitting on the side of the bed? 1   How much help from another person does the patient currently need...   Moving to and from a bed to a chair (including a wheelchair)? 3   Need to walk in a hospital room? 2   Climbing 3-5 steps with a railing? 1   6 clicks Mobility Score 11   Activity Tolerance   Sitting in Chair 2 min   Sitting Edge of Bed 10 min   Standing 1 min   Comments standing activity limited by decreased strength and activity tolerance   Short Term Goals    Short Term Goal # 1 pt will be able to move supine<>eob with spvin 6 tx to improve bed mobility.   Short Term Goal # 2 pt will be able to complete STS with LRAD and spv in 6 tx to improve functional mobility.   Short Term Goal # 3 pt will be able to complete SPT with LRAD and spv in 6 tx to improve functional mobility.   Short Term Goal # 4 pt will be able to propel self 25' in wc with min A in 6 tx for short household distances.   Education Group   Education Provided Role of Physical Therapist   Role of Physical Therapist Patient Response Patient;Acceptance;Explanation;Verbal Demonstration   Problem List    Problems Impaired Bed Mobility;Impaired Transfers;Impaired Ambulation;Functional Strength Deficit;Impaired Balance;Decreased Activity Tolerance   Anticipated Discharge Equipment and Recommendations   DC Equipment Recommendations Unable to determine at this time  (possibly  SPC)   Discharge Recommendations Recommend home health for continued physical therapy services   Interdisciplinary Plan of Care Collaboration   IDT Collaboration with  Nursing   Patient Position at End of Therapy In Bed;Bed Alarm On;Call Light within Reach;Tray Table within Reach;Phone within Reach   Collaboration Comments RN updated   Session Information   Date / Session Number  8/21- 1 (1/4, 8/27)

## 2022-08-21 NOTE — PROGRESS NOTES
Assumed care of pt, bedside report received from Sheri MORGAN. Pt A&Ox 4, no complaints of pain at this time. Responds in one word sentences. Updated on POC, call light in reach, and fall precautions in place. Bed locked and in lowest position. Pt instructed to call for assistance before getting out of bed. All questions answered, no other needs at this time.

## 2022-08-21 NOTE — ASSESSMENT & PLAN NOTE
A1c 9.1%  Not on any home medication  Blood glucose 442 on admission though rapidly improved    -Basal insulin to titrate to -160  -SSI and POCG  -Will need diabetes education counseling  - Diabetic diet

## 2022-08-21 NOTE — PROGRESS NOTES
[facility] Internal Medicine Daily Progress Note    Date of Service  8/20/2022    [facility] Team: R IM Blue Team   Attending: Margoth Ugarte M.d.  Senior Resident: Dr. Padron  Intern:  Dr. Friedman   Contact Number: [phone]    Chief Complaint  Alberto Hunter is a 57 y.o. female admitted 8/19/2022 with acute weakness and debility.     Hospital Course    Patient with PMHx of multiple CVAs (#4) in the past, per the patient's , last was in 2010 and she has not seen a doctor since 7 years ago. She does not take medicines at home. From previous CVAs she has residual severe left sided weakness, with chronic LUE flexion and muscular contraction, unable to ambulate and almost total dependence. Per the patient she was feeling more slurred speech and weak, admitted with BP of 200/100 and suspicion of stroke. MRI negative for acute process.    Interval Problem Update  CT head with a right subtle hemorrage in the caudate, and small vessel ischemia, atherosclerosis. CTA with proximal left A2 occlusion and a small aneurism. CT Hand N with no significant stenosis. Patient passed swalloing test and has not new symptoms, no chest pain, urinating normally and BP btw 160-170/80-90. Did not started her PO meds.     Later on, MRI came back positive. Able to restart more strict BP control. She is tolerating PO.     I have discussed this patient's plan of care and discharge plan at IDT rounds today with Case Management, Nursing, Nursing leadership, and other members of the IDT team.      Code Status  DNAR/DNI    Disposition  Patient is not medically cleared for discharge.   Anticipate discharge to intensive rehab inp/facility.   I have placed the appropriate orders for post-discharge needs.    ROS:   General: No fevers, chills, night sweats, weight loss or gain.  H&N: No hearing changes, vision changes, eye pain, ear pain, tinnitus, nasal discharge, sore throat, no neck swelling.  Pulmonary: No shortness of breath, cough, sputum, or  hemoptysis.  Cardiovascular: No chest pain, palpitations, or LE swelling.   GI: No nausea, vomiting, diarrhea, constipation, abdominal pain, hematochezia or melena.  : No dysuria, frequency, retention or hematuria.   Neuro: No headaches, no lightheadedness, no dizziness.       Physical Exam  Temp:  [36.2 °C (97.2 °F)-36.7 °C (98 °F)] 36.2 °C (97.2 °F)  Pulse:  [] 85  Resp:  [14-21] 20  BP: (150-197)/() 151/84  SpO2:  [92 %-97 %] 95 %      General: Awake, alert and oriented.  Head and Neck: NC/AT, EOMI, no scleral icterus or conjunctival pallor, no nasal discharge or oral erythema or exudates. Neck supple, no cervical or supraclavicular LAD.   CV: Rhythmic heart sounds, no murmurs, gallops or rubs. No S3,S4 or JVD. Radial and dorsalis pedis pulses 2+ and equal bilaterally.   Pulm: Chest expansion is symmetrical, no crackles, rales, rhonchi, or wheezing.  GI: Flat, non distended, soft, nontender, normal bowel sounds.  Skin: Warm, no rashes, no evident lesions.  MSK: Normal ROM. No lower extremity edema. No pain, no lesions.   Neuro: Patient is alert and oriented x4. Speech is unclear, dysarthria noted, non-fluent. She comprehends, names and is clear, no aphasia. Pupils equal, round and reactive to light. Extra ocular movements intact. Facial assymetry with left facial droop and protruding tongue. Body asymmetry, flacid left LE and flexed and contracted LUE, hypertonic, increased DTR on the left side.  Sensitivity grossly intact.   Psych: Appropriate mood and affect.      Fluids    Intake/Output Summary (Last 24 hours) at 8/20/2022 2259  Last data filed at 8/20/2022 0245  Gross per 24 hour   Intake 0 ml   Output 0 ml   Net 0 ml       Laboratory  Recent Labs     08/19/22 2059 08/20/22  1150   WBC 12.9* 10.5   RBC 5.03 4.46   HEMOGLOBIN 15.5 13.9   HEMATOCRIT 42.9 38.2   MCV 85.3 85.7   MCH 30.8 31.2   MCHC 36.1* 36.4*   RDW 38.5 39.6   PLATELETCT 201 233   MPV 10.6 10.2     Recent Labs     08/19/22 2059    SODIUM 141   POTASSIUM 5.2   CHLORIDE 104   CO2 22   GLUCOSE 442*   BUN 16   CREATININE 0.63   CALCIUM 9.7     Recent Labs     08/19/22 2059   APTT 23.3*   INR 0.86*               Imaging  EC-ECHOCARDIOGRAM COMPLETE W/O CONT   Final Result      MR-BRAIN-W/O   Final Result      1.  No evidence of acute ischemia, hemorrhage or mass   2.  Remote BILATERAL basal ganglia infarctions with some areas of remote hemorrhage adjacent to the larger RIGHT-sided area of encephalomalacia   3.  Wallerian degeneration in the RIGHT cerebral peduncle   4.  Atrophy   5.  White matter changes      DX-CHEST-PORTABLE (1 VIEW)   Final Result         1.  No acute cardiopulmonary disease.   2.  Atherosclerosis      CT-CTA NECK WITH & W/O-POST PROCESSING   Final Result         1.  No occlusion, aneurysm, dissection, or high-grade stenosis.   2.  Atherosclerosis         CT-CTA HEAD WITH & W/O-POST PROCESS   Final Result         1.  Short segment occlusion of the proximal left A2 segment with reconstitution.   2.  Small caliber of the right intracranial internal carotid artery at the level of the sella, could represent partial occlusion, hypoplastic segment, or vascular spasm   3.  3.3 mm proximal M2 branches from aneurysm   4.  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   5.  Referral to neuroradiology aneurysm clinic for further management.      These findings were discussed with the patient's clinician, Selvin Nowak, on 8/19/2022 9:41 PM.      CT-CEREBRAL PERFUSION ANALYSIS   Final Result         1.  Cerebral blood flow less than 30% likely representing completed infarct = 0 mL.      2.  T Max more than 6 seconds likely representing combination of completed infarct and ischemia = 0 mL.      3.  Mismatched volume likely representing ischemic brain/penumbra = None      4.  Please note that the cerebral perfusion was performed on the limited brain tissue around the basal  ganglia region. Infarct/ischemia outside the CT perfusion sections can be missed in this study.      CT-HEAD W/O   Final Result         1.  Subtle hyperdensity adjacent to the right caudate head, could represent subtle parenchymal hemorrhage or areas of subtle calcification. Could be further evaluated with MRI of the brain for more definitive characterization as clinically appropriate.   2.  Nonspecific white matter changes, commonly associated with small vessel ischemic disease.  Associated mild cerebral atrophy is noted.   3.  Atherosclerosis.      These findings were discussed with the patient's clinician, Selvin Nowak, on 8/19/2022 9:23 PM.           Assessment/Plan  Problem Representation:    * Hypertensive emergency- (present on admission)  Assessment & Plan  BP up to the 220s over 100s on presentation  Per the patient she had worsening weakness and speech.   MRI negative for acute bleeding/ischemia. Has diffuse atherosclerosis and small vessel ischemia.   No tele events.   -Started oral meds, Losartan 50mg daily + Amlodipine 5mg daily  -IV as needed antihypertensives with goal BP <160/110  -Monitor, tele   - Strict I and Os      Asymptomatic bacteriuria  Assessment & Plan  UA with many bacteria  Asymptomatic  No treatment necessary    History of CVA with residual deficit  Assessment & Plan  History of CVA x4 per patient. Has diffuse ischemic disease.   Chronic left sided hemiparesis with contractures, facial asymmetry and dysarthria.   -High intensity statin  - BP control  - PT/OT  - Speech therapy eval        Uncontrolled diabetes mellitus (HCC)  Assessment & Plan  A1c 9.1%  Not on any home medication  Blood glucose 442 on admission    -Basal insulin  -SSI and POCG  -Will need diabetes education counseling  - Diabetic diet     Lactic acidosis  Assessment & Plan  LA 3.1 on admission  Status post 1 L of NS in the ED  Troponin negative  UA asymptomatic bacteruria  Resolved        VTE prophylaxis:  SCDs/TEDs    I have performed a physical exam and reviewed and updated ROS and Plan today (8/20/2022). In review of yesterday's note (8/19/2022), there are no changes except as documented above.    Jen MG  PGY1 IM

## 2022-08-21 NOTE — CARE PLAN
Problem: Knowledge Deficit - Standard  Goal: Patient and family/care givers will demonstrate understanding of plan of care, disease process/condition, diagnostic tests and medications  Outcome: Progressing     Problem: Skin Integrity  Goal: Skin integrity is maintained or improved  Outcome: Progressing     Problem: Fall Risk  Goal: Patient will remain free from falls  Outcome: Progressing     Problem: Depression  Goal: Patient and family/caregiver will verbalize accurate information about at least two of the possible causes of depression, three-four of the signs and symptoms of depression  Outcome: Progressing   The patient is Stable - Low risk of patient condition declining or worsening    Shift Goals  Clinical Goals: monitor BP, neuro checks  Patient Goals: sleep  Family Goals: n/a    Progress made toward(s) clinical / shift goals:  q4 neuro checks in place     Patient is not progressing towards the following goals:

## 2022-08-22 VITALS
OXYGEN SATURATION: 94 % | HEART RATE: 95 BPM | HEIGHT: 59 IN | BODY MASS INDEX: 20.44 KG/M2 | RESPIRATION RATE: 17 BRPM | WEIGHT: 101.41 LBS | DIASTOLIC BLOOD PRESSURE: 82 MMHG | SYSTOLIC BLOOD PRESSURE: 152 MMHG | TEMPERATURE: 97.3 F

## 2022-08-22 PROBLEM — I16.1 HYPERTENSIVE EMERGENCY: Status: RESOLVED | Noted: 2022-08-20 | Resolved: 2022-08-22

## 2022-08-22 PROBLEM — E87.6 HYPOKALEMIA: Status: RESOLVED | Noted: 2022-08-21 | Resolved: 2022-08-22

## 2022-08-22 PROBLEM — E87.20 LACTIC ACIDOSIS: Status: RESOLVED | Noted: 2022-08-20 | Resolved: 2022-08-22

## 2022-08-22 LAB
ANION GAP SERPL CALC-SCNC: 10 MMOL/L (ref 7–16)
BUN SERPL-MCNC: 15 MG/DL (ref 8–22)
CALCIUM SERPL-MCNC: 9.2 MG/DL (ref 8.5–10.5)
CHLORIDE SERPL-SCNC: 111 MMOL/L (ref 96–112)
CO2 SERPL-SCNC: 21 MMOL/L (ref 20–33)
CREAT SERPL-MCNC: 0.71 MG/DL (ref 0.5–1.4)
GFR SERPLBLD CREATININE-BSD FMLA CKD-EPI: 99 ML/MIN/1.73 M 2
GLUCOSE BLD STRIP.AUTO-MCNC: 108 MG/DL (ref 65–99)
GLUCOSE BLD STRIP.AUTO-MCNC: 111 MG/DL (ref 65–99)
GLUCOSE BLD STRIP.AUTO-MCNC: 127 MG/DL (ref 65–99)
GLUCOSE BLD STRIP.AUTO-MCNC: 146 MG/DL (ref 65–99)
GLUCOSE SERPL-MCNC: 161 MG/DL (ref 65–99)
POTASSIUM SERPL-SCNC: 4.3 MMOL/L (ref 3.6–5.5)
SODIUM SERPL-SCNC: 142 MMOL/L (ref 135–145)

## 2022-08-22 PROCEDURE — 82962 GLUCOSE BLOOD TEST: CPT

## 2022-08-22 PROCEDURE — 700102 HCHG RX REV CODE 250 W/ 637 OVERRIDE(OP): Performed by: STUDENT IN AN ORGANIZED HEALTH CARE EDUCATION/TRAINING PROGRAM

## 2022-08-22 PROCEDURE — 700101 HCHG RX REV CODE 250: Performed by: STUDENT IN AN ORGANIZED HEALTH CARE EDUCATION/TRAINING PROGRAM

## 2022-08-22 PROCEDURE — 80048 BASIC METABOLIC PNL TOTAL CA: CPT

## 2022-08-22 PROCEDURE — 36415 COLL VENOUS BLD VENIPUNCTURE: CPT

## 2022-08-22 PROCEDURE — A9270 NON-COVERED ITEM OR SERVICE: HCPCS | Performed by: STUDENT IN AN ORGANIZED HEALTH CARE EDUCATION/TRAINING PROGRAM

## 2022-08-22 PROCEDURE — 97166 OT EVAL MOD COMPLEX 45 MIN: CPT

## 2022-08-22 RX ORDER — LOSARTAN POTASSIUM 50 MG/1
100 TABLET ORAL
Status: DISCONTINUED | OUTPATIENT
Start: 2022-08-23 | End: 2022-08-22 | Stop reason: HOSPADM

## 2022-08-22 RX ORDER — ASPIRIN 81 MG/1
81 TABLET, CHEWABLE ORAL DAILY
Qty: 100 TABLET | Refills: 0 | Status: SHIPPED | OUTPATIENT
Start: 2022-08-23

## 2022-08-22 RX ORDER — LOSARTAN POTASSIUM 100 MG/1
100 TABLET ORAL DAILY
Qty: 90 TABLET | Refills: 0 | Status: SHIPPED | OUTPATIENT
Start: 2022-08-23

## 2022-08-22 RX ORDER — AMLODIPINE BESYLATE 5 MG/1
5 TABLET ORAL DAILY
Qty: 90 TABLET | Refills: 0 | Status: SHIPPED | OUTPATIENT
Start: 2022-08-23

## 2022-08-22 RX ORDER — ATORVASTATIN CALCIUM 80 MG/1
80 TABLET, FILM COATED ORAL EVERY EVENING
Qty: 90 TABLET | Refills: 0 | Status: SHIPPED | OUTPATIENT
Start: 2022-08-22

## 2022-08-22 RX ORDER — LOSARTAN POTASSIUM 25 MG/1
25 TABLET ORAL ONCE
Status: COMPLETED | OUTPATIENT
Start: 2022-08-22 | End: 2022-08-22

## 2022-08-22 RX ORDER — METFORMIN HYDROCHLORIDE 500 MG/1
500 TABLET, EXTENDED RELEASE ORAL DAILY
Qty: 90 TABLET | Refills: 0 | Status: SHIPPED | OUTPATIENT
Start: 2022-08-22

## 2022-08-22 RX ADMIN — ASPIRIN 81 MG: 81 TABLET, CHEWABLE ORAL at 05:30

## 2022-08-22 RX ADMIN — LOSARTAN POTASSIUM 75 MG: 50 TABLET, FILM COATED ORAL at 05:30

## 2022-08-22 RX ADMIN — LABETALOL HYDROCHLORIDE 10 MG: 5 INJECTION, SOLUTION INTRAVENOUS at 00:08

## 2022-08-22 RX ADMIN — AMLODIPINE BESYLATE 5 MG: 5 TABLET ORAL at 05:30

## 2022-08-22 RX ADMIN — LOSARTAN POTASSIUM 25 MG: 25 TABLET, FILM COATED ORAL at 13:59

## 2022-08-22 ASSESSMENT — COGNITIVE AND FUNCTIONAL STATUS - GENERAL
DRESSING REGULAR UPPER BODY CLOTHING: A LOT
PERSONAL GROOMING: A LITTLE
SUGGESTED CMS G CODE MODIFIER DAILY ACTIVITY: CK
DRESSING REGULAR LOWER BODY CLOTHING: A LOT
EATING MEALS: A LITTLE
TOILETING: A LOT
HELP NEEDED FOR BATHING: A LOT
DAILY ACTIVITIY SCORE: 14

## 2022-08-22 ASSESSMENT — ACTIVITIES OF DAILY LIVING (ADL): TOILETING: REQUIRES ASSIST

## 2022-08-22 NOTE — DISCHARGE INSTRUCTIONS
-start amlodipine 5mg daily and losartan 100mg daily for high blood pressure  -start aspirin 81mg daily and atorvastatin 80mg daily for prevention of repeat stroke  -start metformin long-acting medication 500mg daily for diabetes  -make sure to set up appointment for a primary care doctor as they will need to treat your blood pressure and diabetes to make sure you are really getting better.

## 2022-08-22 NOTE — PROGRESS NOTES
Patient discharged home with . All personal belongings collected. IV access removed. Monitor removed, monitor room notified. Discharge instructions discussed. Medications reviewed;. Follow up appointments pt to call. Patient wheeled off unit via wheelchair without incident.        Pt and pt  agree to  medications at confirmed pharmacy, Pt new wheelchair to be delivered at new address. Case management notified regarding pt new address, and [employer] notified regarding pt new address, all questions answered.

## 2022-08-22 NOTE — DISCHARGE PLANNING
Case Management Discharge Planning    Admission Date: 8/19/2022  GMLOS: 2.2  ALOS: 2    6-Clicks ADL Score: 14  6-Clicks Mobility Score: 11  PT and/or OT Eval ordered: Yes  Post-acute Referrals Ordered: No  Post-acute Choice Obtained: No  Has referral(s) been sent to post-acute provider:  NA      Anticipated Discharge Dispo:  Home    DME Needed: Yes    DME Ordered: Yes    Action(s) Taken: Spoke to pt at bedside, pt gave verbal choice for [facility] for wheelchair. 2nd IMM given at bedside, pt verbalized. Dr. Padron notified of need for DME order and face to face.    Escalations Completed: None    Medically Clear: Yes    Next Steps: f/u with [facility] for wheelchair delivery to home.    Barriers to Discharge: None

## 2022-08-22 NOTE — THERAPY
Occupational Therapy   Initial Evaluation     Patient Name: Alberto Hunter  Age:  57 y.o., Sex:  female  Medical Record #: [id number]  Today's Date: 8/22/2022     Precautions  Precautions: Fall Risk  Comments: hx CVA with L side deficits    Assessment  Patient is 57 y.o. female with a diagnosis of Possible CVA.  Pt is at or near his/her functional baseline. Pt with no further skilled OT needs in the acute care setting at this time.  Pts  confirms that pt is at her baseline.      Plan    Occupational Therapy for Evaluation only        Discharge Recommendations: (P) Anticipate that the patient will have no further occupational therapy needs after discharge from the hospital (pt at her baseline per .  He states that he can care for her at home)          08/22/22 0921   Prior Living Situation   Prior Services Continuous (24 Hour) Care Giving Family   Housing / Facility Motel   Equipment Owned Wheelchair   Lives with - Patient's Self Care Capacity Spouse   Prior Level of ADL Function   Self Feeding Independent   Grooming / Hygiene Independent   Bathing Requires Assist   Dressing Requires Assist   Toileting Requires Assist   Comments  states the he assists with ADLs and mobility   Prior Level of IADL Function   Medication Management Requires Assist   Laundry Requires Assist   Kitchen Mobility Requires Assist   Finances Requires Assist   Home Management Requires Assist   Shopping Requires Assist   Prior Level Of Mobility Uses Wheel Chair for in Home Mobility   ADL Assessment   Grooming Supervision   Upper Body Dressing Minimal Assist   Lower Body Dressing Moderate Assist   Functional Mobility   Sit to Stand Minimal Assist   Bed, Chair, Wheelchair Transfer Minimal Assist   Anticipated Discharge Equipment and Recommendations   Discharge Recommendations Anticipate that the patient will have no further occupational therapy needs after discharge from the hospital  (pt at her baseline per .  He states  that he can care for her at home)

## 2022-08-22 NOTE — CARE PLAN
The patient is Watcher - Medium risk of patient condition declining or worsening    Shift Goals  Clinical Goals: replace potassium  Patient Goals: rest  Family Goals: safe discharge    Progress made toward(s) clinical / shift goals:      Problem: Knowledge Deficit - Standard  Goal: Patient and family/care givers will demonstrate understanding of plan of care, disease process/condition, diagnostic tests and medications  Outcome: Progressing     Problem: Skin Integrity  Goal: Skin integrity is maintained or improved  Outcome: Progressing     Problem: Fall Risk  Goal: Patient will remain free from falls  Outcome: Progressing       Patient is not progressing towards the following goals:

## 2022-08-22 NOTE — FACE TO FACE
Face to Face Note  -  Durable Medical Equipment    Enma Padron M.D. - NPI: [id number]  I certify that this patient is under my care and that they had a durable medical equipment(DME)face to face encounter by myself that meets the physician DME face-to-face encounter requirements with this patient on:    Date of encounter:   Patient:                    MRN:                       YOB: 2022  Alberto Hunter  [id number]  1965     The encounter with the patient was in whole, or in part, for the following medical condition, which is the primary reason for durable medical equipment:  CVA    I certify that, based on my findings, the following durable medical equipment is medically necessary:    Wheelchair   Patient needs manual wheelchair for use inside the home based on the above diagnosis. Per guidelines patient meets criteria in the following ways:   A.  Patient has significant impairment in the following Toileting, Feeding, Dressing, Grooming, and Bathing and is is unable to complete these tasks in a reasonable timeframe and places the patient at a heightened risk of morbidity.   B.  The patient's mobility limitations cannot be sufficiently resolved by use of  fitted cane or walker.   C.  The patient reports his home provides adequate access between rooms,  maneuvering space, and surfaces for use of the manual wheelchair that is  provided.   D.  The use of the manual wheelchair will significantly improve the patient's  ability to participate in MRADLs and the patient will use it on a regular basis in  the home.   E.  The patient has not expressed an unwillingness to use the manual  wheelchair.   F. The patient has limitations of strength, endurance, range of motion, or coordination per OT notes:.    My Clinical findings support the need for the above equipment due to:  Bedbound/non-weight bearing

## 2022-08-22 NOTE — CARE PLAN
The patient is Stable - Low risk of patient condition declining or worsening    Shift Goals  Clinical Goals: discharge  Patient Goals: discharge  Family Goals: safe discharge    Progress made toward(s) clinical / shift goals:  patient being discharged   Problem: Knowledge Deficit - Standard  Goal: Patient and family/care givers will demonstrate understanding of plan of care, disease process/condition, diagnostic tests and medications  Outcome: Progressing     Problem: Skin Integrity  Goal: Skin integrity is maintained or improved  Outcome: Progressing       Patient is not progressing towards the following goals:

## 2022-08-22 NOTE — DISCHARGE PLANNING
Received Choice form at 1150  Agency/Facility Name: [facility]  Referral sent per Choice form @ 1157     1600-  Agency/Facility Name: [facility]  Spoke To: Nolan  Outcome: DPA confirmed updated address for WC delivery with no further follow up needs at this time.    RN CM and bedside RN notified

## 2022-08-25 LAB
BACTERIA BLD CULT: NORMAL
BACTERIA BLD CULT: NORMAL
SIGNIFICANT IND 70042: NORMAL
SIGNIFICANT IND 70042: NORMAL
SITE SITE: NORMAL
SITE SITE: NORMAL
SOURCE SOURCE: NORMAL
SOURCE SOURCE: NORMAL

## 2022-09-15 NOTE — DISCHARGE SUMMARY
"[facility] Internal Medicine Discharge Summary    Attending: Dr. Margoth Ugarte  Senior Resident: Dr. Enma Padron  Intern:  Dr. Jen Friedman  Contact Number: [phone]    CHIEF COMPLAINT ON ADMISSION  Chief Complaint   Patient presents with    Possible Stroke     LKW 1900. Baseline L sided weakness from prior stroke. Acute \"difficulty speaking\"/slurred speech per , currently can answer questions but slow to respond. No new focal/unilateral deficits. .       Reason for Admission  Stroke     Admission Date  8/19/2022    CODE STATUS  Prior    HPI & HOSPITAL COURSE  This is a 57 y.o. female here with PMHx of multiple CVAs (#4) in the past, per the patient's , last was in 2010 and she had not seen a doctor since 7 years ago. She does not take medicines at home. From previous CVAs she has residual severe left sided weakness, with chronic LUE flexion and muscular contraction, unable to ambulate and almost total dependence. Per the patient she was feeling more slurred speech and weak, admitted with BP of 200/100 and suspicion of stroke. MRI negative for acute process.    CT head with a possible right subtle hemorrage in the caudate, and small vessel ischemia, atherosclerosis. CTA with proximal left A2 occlusion and a small aneurism. CT Hand N with no significant stenosis. Patient passed swallowing test and had no new symptoms, no chest pain, urinating normally and BP btw 160-170/80-90.      Later on, MRI came back negaive for acute ischemic or hemorrhagic stroke. Positive for prior strokes.  Able to restart more strict BP control. She is tolerating PO. PT recommending SNF, ultimately opted for home as patient was at her baseline function at time of discharge and has support from her . Started secondary prevention w/ aspirin and increased statin on discharge along w/ antihypertensive regimen. She was given resources and information to establish with primary care.     * Hypertensive emergency- (present " "on admission)  Assessment & Plan  BP up to the 220s over 100s on presentation  Per the patient she had worsening weakness and speech.   MRI negative for acute bleeding/ischemia. Has diffuse atherosclerosis and small vessel ischemia.   No tele events.   -Started oral meds, increased losartan to 75mg, added amodipine 5mg  -IV as needed antihypertensives with goal BP <160/110  -Monitor, tele   - Strict I and Os  MRI brain neg for acute CVA though demonstrated chronic old CVAs and hemorrhage        Hypokalemia  Assessment & Plan  -likely in setting of cellular shifts and insulin administration  -replete w/ IV and PO     Dysarthria following cerebral infarction  Assessment & Plan  Acute on chronic dysarthria  Patient was in hypertensive emergency which may be the etiology of her symptoms given lack of otherwise no focal deficits from baseline  CT head did show possible concern for small punctate hemorrhage in the right caudate head but most likely consistent with calcification     -MRI brain demonstrated chronic multiple infarcts, no acute infarct or hemorrhage  -High intensity statin  -Lipid panel  -Blood pressure control with goal <160/110  -started ASA 81mg for secondary prevention  -chronic dysarthria and LUE and LLE paralysis  -PT rec SNF-->pt noted to be at baseline on discharge and sent home w/   -SLP approved diet        History of CVA with residual deficit  Assessment & Plan  History of CVA x4 per patient. Has diffuse ischemic disease.   Chronic left sided hemiparesis with contractures, facial asymmetry and dysarthria.   -High intensity statin  - BP control  - PT/OT  - Speech therapy eval  -see \"dysarthria\"           Uncontrolled diabetes mellitus (HCC)  Assessment & Plan  A1c 9.1%  Not on any home medication  Blood glucose 442 on admission though rapidly improved     -Basal insulin to titrate to -160  -SSI and POCG  -Will need diabetes education counseling  - Diabetic diet      Lactic " acidosis  Assessment & Plan  LA 3.1 on admission  Status post 1 L of NS in the ED  Troponin negative  UA asymptomatic bacteruria  Resolved         Asymptomatic bacteriuria  Assessment & Plan  UA with many bacteria  Asymptomatic  No treatment necessary      Therefore, she is discharged in fair and stable condition to home with close outpatient follow-up.    The patient met 2-midnight criteria for an inpatient stay at the time of discharge.    Discharge Date  8/22/2022    Physical Exam on Day of Discharge  General: Awake, alert and oriented.  Head and Neck: NC/AT, EOMI, no scleral icterus or conjunctival pallor, no nasal discharge or oral erythema or exudates. Neck supple, no cervical or supraclavicular LAD.   CV: Rhythmic heart sounds, no murmurs, gallops or rubs. No S3,S4 or JVD. Radial and dorsalis pedis pulses 2+ and equal bilaterally.   Pulm: Chest expansion is symmetrical, no crackles, rales, rhonchi, or wheezing.  GI: Flat, non distended, soft, nontender, normal bowel sounds.  Skin: Warm, no rashes, no evident lesions.  MSK: Normal ROM. No lower extremity edema. No pain, no lesions.   Neuro: Patient is alert and oriented x4. Speech is unclear, dysarthria noted, non-fluent. She comprehends, names and is clear, no aphasia. Pupils equal, round and reactive to light. Extra ocular movements intact. Facial assymetry with left facial droop and protruding tongue. Body asymmetry, flacid left LE and flexed and contracted LUE, hypertonic, increased DTR on the left side.  Sensitivity grossly intact.   Psych: Appropriate mood and affect.      FOLLOW UP ITEMS POST DISCHARGE  -new antihypertensive regimen  -ASA 81mg and statin for secondary prevention  -follow up with PCP  -started on metformin at discharge    DISCHARGE DIAGNOSES  Principal Problem (Resolved):    Hypertensive emergency POA: Yes  Active Problems:    Dysarthria following cerebral infarction POA: Unknown    Uncontrolled diabetes mellitus (HCC) POA: Unknown     History of CVA with residual deficit POA: Unknown    Asymptomatic bacteriuria POA: Unknown  Resolved Problems:    Hypokalemia POA: Unknown    Lactic acidosis POA: Unknown    Leukocytosis POA: Unknown      FOLLOW UP  No future appointments.  [facility] ([facility]  [address] [place] [id number]  [phone]  Call        MEDICATIONS ON DISCHARGE     Medication List        START taking these medications        Instructions   amLODIPine 5 MG Tabs  Commonly known as: NORVASC   Take 1 Tablet by mouth every day.  Dose: 5 mg     aspirin 81 MG Chew chewable tablet  Commonly known as: ASA   Chew 1 Tablet every day.  Dose: 81 mg     atorvastatin 80 MG tablet  Commonly known as: LIPITOR   Take 1 Tablet by mouth every evening.  Dose: 80 mg     losartan 100 MG Tabs  Commonly known as: COZAAR   Take 1 Tablet by mouth every day.  Dose: 100 mg     metFORMIN  MG Tb24  Commonly known as: GLUCOPHAGE XR   Take 1 Tablet by mouth every day.  Dose: 500 mg              Allergies  Not on File    DIET  No orders of the defined types were placed in this encounter.      ACTIVITY  As tolerated.  Weight bearing as tolerated    CONSULTATIONS  Neurology --Dr. Sheffield    PROCEDURES  None    LABORATORY  Lab Results   Component Value Date    SODIUM 142 08/22/2022    POTASSIUM 4.3 08/22/2022    CHLORIDE 111 08/22/2022    CO2 21 08/22/2022    GLUCOSE 161 (H) 08/22/2022    BUN 15 08/22/2022    CREATININE 0.71 08/22/2022        Lab Results   Component Value Date    WBC 13.4 (H) 08/21/2022    HEMOGLOBIN 14.2 08/21/2022    HEMATOCRIT 39.5 08/21/2022    PLATELETCT 214 08/21/2022        Total time of the discharge process exceeds 70 minutes.